# Patient Record
Sex: MALE | Race: WHITE | NOT HISPANIC OR LATINO | Employment: FULL TIME | URBAN - METROPOLITAN AREA
[De-identification: names, ages, dates, MRNs, and addresses within clinical notes are randomized per-mention and may not be internally consistent; named-entity substitution may affect disease eponyms.]

---

## 2019-11-21 ENCOUNTER — APPOINTMENT (OUTPATIENT)
Dept: RADIOLOGY | Facility: MEDICAL CENTER | Age: 44
End: 2019-11-21
Payer: COMMERCIAL

## 2019-11-21 VITALS
DIASTOLIC BLOOD PRESSURE: 81 MMHG | HEART RATE: 80 BPM | HEIGHT: 72 IN | SYSTOLIC BLOOD PRESSURE: 147 MMHG | WEIGHT: 268 LBS | BODY MASS INDEX: 36.3 KG/M2

## 2019-11-21 DIAGNOSIS — M25.521 PAIN IN RIGHT ELBOW: Primary | ICD-10-CM

## 2019-11-21 DIAGNOSIS — M25.521 PAIN IN RIGHT ELBOW: ICD-10-CM

## 2019-11-21 PROCEDURE — 99204 OFFICE O/P NEW MOD 45 MIN: CPT | Performed by: ORTHOPAEDIC SURGERY

## 2019-11-21 PROCEDURE — 73080 X-RAY EXAM OF ELBOW: CPT

## 2019-11-21 NOTE — PROGRESS NOTES
Chief Complaint     Right elbow pain secondary to injury      History of the Present Illness     Felipe Solis is a 40 y o  RHD male who presents today with chief complaint of right posterior elbow pain times 1 week  He reports that he was playing basketball when he suffered a downward blow to his right arm while going up for a rebound resulting in a jarring sensation of the elbow and immediate onset pain  He reports having swelling in the back shortly following the injury  He expresses having and intense, achy pain 6-7/10 with activities as light and attempting to throw a small piece of paper into a waist basket  He denies any previous history of injury though he has had some swelling and pain there occasionally over the last few years  He is not currently taking any medications for pain  He denies any associated bruising, numbness, or tingling  History reviewed  No pertinent past medical history  History reviewed  No pertinent surgical history  No Known Allergies    No current outpatient medications on file prior to visit  No current facility-administered medications on file prior to visit  Social History     Tobacco Use    Smoking status: Former Smoker     Last attempt to quit:      Years since quittin 9    Smokeless tobacco: Former User   Substance Use Topics    Alcohol use: Yes    Drug use: Not Currently       Family History   Problem Relation Age of Onset    Cancer Mother     Cancer Father     Hypertension Father        Review of Systems     As stated in the HPI  All other systems were reviewed and are negative  Physical Exam     /81   Pulse 80   Ht 5' 11 75" (1 822 m)   Wt 122 kg (268 lb)   BMI 36 60 kg/m²     GENERAL: This is a well-developed, well-nourished, age-appropriate patient in no acute distress  The patient is alert and oriented x3  Pleasant and cooperative  Eyes: Anicteric sclerae  Extraocular movements appear intact    HENT: Nares are patent with no drainage  Lungs: There is equal chest rise on inspection  Breathing is non-labored with no audible wheezing  Cardiovascular: No cyanosis  No upper extremity lymphadema  Skin: Skin is warm to touch  No obvious skin lesions or rashes other than described below  Neurologic: No ataxia  Psychiatric: Mood and affect are appropriate  Evaluation of the right elbow shows mild swelling about the posterior elbow at the olecranon process  Skin is warm and dry to touch with no signs of erythema, ecchymosis, or infection  He is moderately tender to palpation over the distal triceps tendon at insertion on the olecranon process  He is also tender to palpation over the medial epicondyle  He is able demonstrate active elbow extension to 20° with pain, as compared to only being able to reach 10° on the left upper extremity  He has significant symptom exacerbation with resisted elbow extension but has 5/5 strength  He is able demonstrate elbow flexion of 125°, which is equal bilaterally  Elbow is stable to varus and valgus stress  He demonstrates normal shoulder, wrist, and finger ROM  Radial, median, and ulnar motor and sensory distributions intact  2+ distal radial pulse with brisk capillary refill of the fingers  Sensation light touch in intact distally  Data Review     Results Reviewed     None             Imaging: Three-view imaging of the right elbow taken in the office and personally reviewed by me today shows evidence of an enthesophyte fracture on dorsum of the olecranon along with ulnohumeral and radiocapitellar arthrosis that is mild    Assessment and Plan      Diagnoses and all orders for this visit:    Pain in right elbow  -     XR elbow 3+ vw right; Future         49-year-old male with a presumed partial triceps rupture  Given the radiographic and clinical findings, I do believe that he likely has a partial triceps rupture    To ascertain the degree to which she has ruptured, an MRI will be obtained  We discussed that partial ruptures less than 50% can be treated either conservatively with nonsurgical means and therapy or stabilization in surgery  Typically, triceps tendon repairs are indicated for greater than 50% tendon tearing with significant weakness and or pain  Patient like to obtain the MRI and discuss this further  We will see him back in about a week after the MRI  For now, his limitations simply include no isolated triceps extensions        Follow Up: 1 week    To Do Next Visit: MRI follow up and management discussion    PROCEDURES PERFORMED:  Procedures  No Procedures performed today     Scribe Attestation    I,:   Masood Cunningham am acting as a scribe while in the presence of the attending physician :        I,:   Alexi Astudillo MD personally performed the services described in this documentation    as scribed in my presence :

## 2019-11-23 ENCOUNTER — HOSPITAL ENCOUNTER (OUTPATIENT)
Dept: MRI IMAGING | Facility: HOSPITAL | Age: 44
Discharge: HOME | End: 2019-11-23
Attending: ORTHOPAEDIC SURGERY
Payer: COMMERCIAL

## 2019-11-23 DIAGNOSIS — M25.521 PAIN IN RIGHT ELBOW: ICD-10-CM

## 2019-11-23 PROCEDURE — 73221 MRI JOINT UPR EXTREM W/O DYE: CPT

## 2019-11-26 ENCOUNTER — OFFICE VISIT (OUTPATIENT)
Dept: OBGYN CLINIC | Facility: MEDICAL CENTER | Age: 44
End: 2019-11-26
Payer: COMMERCIAL

## 2019-11-26 VITALS
HEIGHT: 72 IN | WEIGHT: 266 LBS | DIASTOLIC BLOOD PRESSURE: 84 MMHG | HEART RATE: 74 BPM | SYSTOLIC BLOOD PRESSURE: 146 MMHG | BODY MASS INDEX: 36.03 KG/M2

## 2019-11-26 DIAGNOSIS — M77.8 TENDINITIS OF RIGHT TRICEPS: Primary | ICD-10-CM

## 2019-11-26 PROCEDURE — 99214 OFFICE O/P EST MOD 30 MIN: CPT | Performed by: ORTHOPAEDIC SURGERY

## 2019-11-26 NOTE — PROGRESS NOTES
Chief Complaint     Right elbow pain      History of the Present Illness     Zehra Meek is a 40 y o  male who returns with right elbow pain  He notes that he got his MRI  He notes that his elbow is not feeling a tremendous amount different  He is still having pain with exercises where he extends his elbow  Denies any catching clicking popping or locking  No numbness or tingling  Does note a restriction to his range of motion  Has not been using a sling or splint          History reviewed  No pertinent past medical history  History reviewed  No pertinent surgical history  No Known Allergies    No current outpatient medications on file prior to visit  No current facility-administered medications on file prior to visit  Social History     Tobacco Use    Smoking status: Former Smoker     Last attempt to quit:      Years since quittin 9    Smokeless tobacco: Former User   Substance Use Topics    Alcohol use: Yes    Drug use: Not Currently       Family History   Problem Relation Age of Onset    Cancer Mother     Cancer Father     Hypertension Father        Review of Systems     As stated in the HPI  All other systems were reviewed and are negative  Physical Exam     /84   Pulse 74   Ht 5' 11 75" (1 822 m)   Wt 121 kg (266 lb)   BMI 36 33 kg/m²     GENERAL: This is a well-developed, well-nourished, age-appropriate patient in no acute distress  The patient is alert and oriented x3  Pleasant and cooperative  Eyes: Anicteric sclerae  Extraocular movements appear intact  HENT: Nares are patent with no drainage  Lungs: There is equal chest rise on inspection  Breathing is non-labored with no audible wheezing  Cardiovascular: No cyanosis  No upper extremity lymphadema  Skin: Skin is warm to touch  No obvious skin lesions or rashes other than described below  Neurologic: No ataxia  Psychiatric: Mood and affect are appropriate      Examination the right upper extremity reveals limited range of motion from about 22° to 120° in flexion extension on the right elbow  Full pro supination  Full digital motion  Brisk capillary refill  5/5 Motor to the APB, FDI, FDP2, FDP5, EDC  Sensation intact to light touch in the median, radial, and ulnar nerve distribution  Tenderness over the distal insertion of the triceps with palpable increased fight  Pain with resisted elbow extension      Data Review     Results Reviewed     None             Imaging:  MRI noncontrast of the right elbow was taken an outside facility personally reviewed by me today shows evidence of diffuse tendinopathy to the triceps insertion with an enthesophyte fracture    Assessment and Plan      Diagnoses and all orders for this visit:    Tendinitis of right triceps  -     Ambulatory referral to Physical Therapy; Future             42-year-old male with severe right triceps tendinopathy  I discussed surgical nonsurgical management with him  Surgical management would involve cutting out the chronic triceps tendon and reattaching it with either bone tunnels are suture anchors  This would require a lot of upfront cost in terms of time and physical therapy  We also discussed that there are surgical risks associated with this  Furthermore, we discussed that nonsurgical management would also be a good option  He would be working on range of motion and stretching of the right elbow for about a 6 week period and working on gentle strengthening over the following 6 weeks  Given that there is no acute rupture, we discussed that we could perform the chronic tendinopathy surgery at any point in the future  I also discussed with him that further resisted the centric loading exercises would potentially cause full rupture of the tendon, but there is no way of knowing if this would occur and with what force  He would like to pursue nonoperative management  I am in agreement that this is a reasonable plan    We will send to therapist see him back in 6 weeks        Follow Up:  6 weeks    To Do Next Visit:     PROCEDURES PERFORMED:  Procedures  No Procedures performed today

## 2019-11-29 ENCOUNTER — EVALUATION (OUTPATIENT)
Dept: PHYSICAL THERAPY | Facility: CLINIC | Age: 44
End: 2019-11-29
Payer: COMMERCIAL

## 2019-11-29 DIAGNOSIS — M77.8 TENDINITIS OF RIGHT TRICEPS: Primary | ICD-10-CM

## 2019-11-29 PROCEDURE — 97162 PT EVAL MOD COMPLEX 30 MIN: CPT | Performed by: PHYSICAL THERAPIST

## 2019-11-29 NOTE — PROGRESS NOTES
PT Evaluation     Today's date: 2019  Patient name: Tad Dowling  : 1975  MRN: [id number]  Referring provider: Ciara Skaggs MD  Dx:   Encounter Diagnosis     ICD-10-CM    1  Tendinitis of right triceps M77 9 Ambulatory referral to Physical Therapy       Start Time: 0800  Stop Time: 0900  Total time in clinic (min): 60 minutes    Assessment  Assessment details: Tad Dowling is a 40 y o  male who presents with signs and symptoms consistent of triceps tendonopathy  Patient states that he was playing basketball on  and went up for layup when another player pulled his arm back and placed it in a hyperextended position  Patient noted hear a "pop" and having immediate and swelling  Patient received MRI and x-ray which showed an incomplete partial-thickness tear of the R triceps tendon as well as a fracture to the enthesophyte  Patient presents with R elbow pain with extension and pushing up off surfaces, decreased strength in the R elbow secondary to pain, increased swelling at the R olecranon, and decreased ROM throughout R elbow  Due to these impairments, patient has difficulty performing a/iadls and recreational activities  Plan to begin strengthening after 6 weeks as per orders from doctor  Patient would benefit from skilled physical therapy to address the impairments, improve their level of function, and to improve their overall quality of life  Impairments: abnormal or restricted ROM, impaired physical strength and pain with function    Symptom irritability: moderateUnderstanding of Dx/Px/POC: good   Prognosis: good    Goals  Short Term Goals: to be achieved by 4 weeks  1) Patient to be independent with basic HEP  2) Decrease pain to 2/10 at its worst   3) Increase cervical spine ROM by 5-10 degrees in all deficient planes  4) Increase UE strength by 1/2 MMT grade in all deficient planes      Long Term Goals: to be achieved by discharge  1) FOTO equal to or greater than 81 indicating improvements with overall function  2) Patient to be independent with comprehensive HEP  3) Patient will have full ROM throughout the elbow to improve a/iadls  4) Patient will be able to tolerate resisted movements at the elbow with little to no discomfort to improve a/iadls  5) Patient will decrease pain 0/10 in order to participate in recreational activities  5) Lifting is improved to maximal level of function  Plan  Plan details: 1x per week for 4-6 weeks   Patient would benefit from: PT eval and skilled physical therapy  Planned therapy interventions: home exercise program, manual therapy, patient education, therapeutic activities, therapeutic exercise and neuromuscular re-education  Treatment plan discussed with: patient        Subjective Evaluation    History of Present Illness  Mechanism of injury: History of Current Injury: Patient states that he was playing basketball on November 7th and went up for layup when another player pulled his arm and placed it in a hyperextended position  Patient noted that he felt and heard a popping sensation and had swelling immediately  Patient states that he took off for a little and wasn't getting better so he went to the doctor  Patient received a x-ray and MRI which showed a incomplete partial-thickness tear of the R triceps tendon as well as a fracture to the enthesophyte   Patient notes that he has had tendonitis in both elbows (10+ years) for participating in sports and weight lifting most of his life  Pain location/Descriptors: R Triceps; sharp pain at the olecranon process and triceps insertion  No radiating pain or numbness and tingling  Aggravating factors: fully extending his arm; pushing off of something; repetitive movements  Easing factors: rest; occasionally iced and it seemed to get better  24 HR pattern: Patient states that the pain has gotten better     Imaging: x-ray and MRI; incomplete partial-thickness tear of the R triceps tendon as well as a fracture to the enthesophyte   Special Questions: patient states that pain used to wake him up at night but no longer does  Patient goals: Patient stays that he goals would be minimize the pain ad improve flexibility  Hobbies/Interest: Basketball, weight train, and overall exercise  Occupation: Works in office     Pain  Current pain ratin  At best pain ratin  At worst pain ratin  Location: R triceps   Quality: sharp  Relieving factors: ice and relaxation      Diagnostic Tests  X-ray: abnormal (Fracture to the enthesophyte )  MRI studies: abnormal (incomplete partial-thickness tear of the R triceps tendon as well as a fracture to the enthesophyte )  Patient Goals  Patient goals for therapy: increased motion and decreased pain  Patient goal: Patient stays that he goals would be minimize the pain ad improve flexibility           Objective     Neurological Testing     Sensation     Shoulder   Left Shoulder   Intact: light touch    Right Shoulder   Intact: light touch    Active Range of Motion   Left Shoulder   Flexion: WFL  Extension: WFL  Abduction: WFL  Internal rotation 90°: WFL    Right Shoulder   Flexion: WFL  Extension: WFL  Abduction: WFL  Internal rotation 90°: WFL    Left Elbow   Flexion: 150 degrees WFL  Extension: 0 degrees WFL    Right Elbow   Flexion: 130 degrees with pain  Extension: -9 degrees with pain    Additional Active Range of Motion Details  Functional ER and IR:   ER=L: T3 (Scapular spine); R: T3 (scapular spine)    IR=L: T7; R: T10 (pain in the right elbow)           Strength/Myotome Testing     Left Shoulder     Planes of Motion   Flexion: 5   Extension: 5   Abduction: 5     Isolated Muscles   Biceps: 5   Lower trapezius: 4+   Middle trapezius: 4   Triceps: 4 (pain  with resistance )     Right Shoulder     Planes of Motion   Flexion: 5   Extension: 5   Abduction: 5     Isolated Muscles   Biceps: 5   Lower trapezius: 4+   Middle deltoid: 5   Middle trapezius: 4   Triceps: 5     Left Elbow   Flexion: 5  Extension: 4+    Right Elbow   Flexion: 5  Extension: 4    Additional Strength Details          Tests     Additional Tests Details   Testing:   R elbow flexed: L: 41lbs; R: 41 lbs    R elbow extend: R= 36 lbs; L= 40 lbs     Edema observation: Increased edema at the olecranon process of the R elbow       Edema Measurement:  R elbow=32 5 cm; L elbow=31 5 cm               Precautions: asthma, NO STRENGTHENING for 6x weeks/Re-eval with physician       Manual              STM or IASTM to R tricep tendon              PROM elbow              Elbow Mobs                                            Exercise Diary              PROM elbow extension  HEP            PROM elbow flexion HEP            Elbow extension with gripping HEP             Shoulder retractions  HEP             AROM supination/pronation with flexbar             Flexbar wrist eccentrics              Gripping              Weighted wrist flex/ext              Shoulder extensions             Wall slides                                                                                                         Arm bike                                            Modalities

## 2019-12-05 ENCOUNTER — OFFICE VISIT (OUTPATIENT)
Dept: PHYSICAL THERAPY | Facility: CLINIC | Age: 44
End: 2019-12-05
Payer: COMMERCIAL

## 2019-12-05 DIAGNOSIS — M77.8 TENDINITIS OF RIGHT TRICEPS: Primary | ICD-10-CM

## 2019-12-05 PROCEDURE — 97110 THERAPEUTIC EXERCISES: CPT | Performed by: PHYSICAL THERAPIST

## 2019-12-05 PROCEDURE — 97140 MANUAL THERAPY 1/> REGIONS: CPT | Performed by: PHYSICAL THERAPIST

## 2019-12-05 NOTE — PROGRESS NOTES
Daily Note     Today's date: 2019  Patient name: Boubacar Jackson  : 1975  MRN: [id number]  Referring provider: Thomas Lerma MD  Dx:   Encounter Diagnosis     ICD-10-CM    1  Tendinitis of right triceps M77 9        Start Time:   Stop Time:   Total time in clinic (min): 45 minutes    Subjective: Patient states that he elbow is doing a little bit better  Patient states that the R elbow pain at the triceps tendon is 4/10  Patient notes that he has been completing his exercises 1x a time and 2x a day if he can remember  Patient has also been icing  Objective: See treatment diary below      Assessment: Patient tolerated treatment well this date  Patient had minimal amounts of pain throughout exercises  Focused on elbow AROM and wrist and forearm secondary to strengthening restrictions at the elbow  Post manual treatment slight increase in elbow flexion and more mobility throughout the entire joint  Patient would benefit from continued PT      Plan: Continue per plan of care        Precautions: asthma, NO STRENGTHENING for 6x weeks/Re-eval with physician       Manual              STM or IASTM to R tricep tendon  5'            PROM elbow (PA-AP radial head; distraction              Elbow Mobs                                            Exercise Diary              PROM elbow extension  HEP            PROM elbow flexion HEP            Elbow extension with gripping HEP             Shoulder retractions  HEP  2x10 reps            AROM supination/pronation with flexbar  2x10 reps RFB           Flexbar wrist eccentrics   3x10 reps           Gripping   Green            Weighted wrist flex/ext   3x10 reps           Shoulder extensions             Wall slides              Elbow flexion/extension AROM   3x10 reps                                                   Game ready                                        Arm bike                                            Modalities              Game -ready cold  5'  Post Tx

## 2019-12-12 ENCOUNTER — OFFICE VISIT (OUTPATIENT)
Dept: PHYSICAL THERAPY | Facility: CLINIC | Age: 44
End: 2019-12-12
Payer: COMMERCIAL

## 2019-12-12 DIAGNOSIS — M77.8 TENDINITIS OF RIGHT TRICEPS: Primary | ICD-10-CM

## 2019-12-12 PROCEDURE — 97140 MANUAL THERAPY 1/> REGIONS: CPT | Performed by: PHYSICAL THERAPIST

## 2019-12-12 PROCEDURE — 97110 THERAPEUTIC EXERCISES: CPT | Performed by: PHYSICAL THERAPIST

## 2019-12-12 NOTE — PROGRESS NOTES
Daily Note     Today's date: 2019  Patient name: Susana Yanes  : 1975  MRN: [id number]  Referring provider: Dejah Brown MD  Dx:   Encounter Diagnosis     ICD-10-CM    1  Tendinitis of right triceps M77 9        Start Time: 0800  Stop Time: 0830  Total time in clinic (min): 30 minutes    Subjective: Patient notes that the R elbow pain is improving  Patient notes that his motion is improving and that he is having less pain with movement  No other changes this date  Patient reports he has to leave 15 minutes early this session secondary to bringing his daughter to school  Objective: See treatment diary below      Assessment: Patient tolerated treatment well  Patient had minimal complaints throughout exercises  Patient noted UE fatigued during and post session  Patient would benefit from continued PT  Plan: Continue per plan of care        Precautions: asthma, NO STRENGTHENING for 6x weeks/Re-eval with physician       Manual             STM or IASTM to R tricep tendon  5' 5'           PROM elbow              Elbow Mobs (PA-AP radial head; distraction   5'                                         Exercise Diary             PROM elbow extension  HEP            PROM elbow flexion HEP            Elbow extension with gripping HEP             Shoulder retractions  HEP  2x10 reps  3x10 YTB          AROM supination/pronation with flexbar  3x10 reps 2# 3x10 reps 2#          Flexbar wrist eccentrics   3x10 reps 3x10 reps          Gripping   Green   3x10 Green   3x10          Weighted wrist flex/ext   3x10 reps  3# 3x10 reps  3#          Shoulder extensions    3x10 reps          Wall slides with lift off   3x10 reps           Elbow flexion/extension AROM   3x10 reps  3x10 reps                                                                                         Arm bike                                            Modalities              Game -ready cold  5'  Post Tx 30 min treatment secondary to patient leaving early

## 2019-12-17 NOTE — PROGRESS NOTES
Daily Note     Today's date: 2019  Patient name: Darci Cockayne  : 1975  MRN: [id number]  Referring provider: Ana Prather MD  Dx: No diagnosis found  Subjective: ***      Objective: See treatment diary below      Assessment: Tolerated treatment well  Patient would benefit from continued PT      Plan: Continue per plan of care        Precautions: asthma, NO STRENGTHENING for 6x weeks/Re-eval with physician       Manual             STM or IASTM to R tricep tendon  5' 5'           PROM elbow              Elbow Mobs (PA-AP radial head; distraction   5'                                         Exercise Diary             PROM elbow extension  HEP            PROM elbow flexion HEP            Elbow extension with gripping HEP             Shoulder retractions  HEP  2x10 reps  3x10 YTB          AROM supination/pronation with flexbar  3x10 reps 2# 3x10 reps 2#          Flexbar wrist eccentrics   3x10 reps 3x10 reps          Gripping   Green   3x10 Green   3x10          Weighted wrist flex/ext   3x10 reps  3# 3x10 reps  3#          Shoulder extensions    3x10 reps          Wall slides with lift off   3x10 reps           Elbow flexion/extension AROM   3x10 reps  3x10 reps                                                                                         Arm bike                                            Modalities              Game -ready cold  5'  Post Tx                                      30 min treatment secondary to patient leaving early

## 2019-12-18 ENCOUNTER — APPOINTMENT (OUTPATIENT)
Dept: PHYSICAL THERAPY | Facility: CLINIC | Age: 44
End: 2019-12-18
Payer: COMMERCIAL

## 2019-12-23 NOTE — PROGRESS NOTES
Daily Note     Today's date: 2019  Patient name: Bhumika Davila  : 1975  MRN: [id number]  Referring provider: Demi Stuart MD  Dx: No diagnosis found  Subjective: ***      Objective: See treatment diary below      Assessment: Patient tolerated treatment well  Patient would benefit from continued PT      Plan: Continue per plan of care        Precautions: asthma, NO STRENGTHENING for 6x weeks/Re-eval with physician       Manual            STM or IASTM to R tricep tendon  5' 5'           PROM elbow              Elbow Mobs (PA-AP radial head; distraction   5'                                         Exercise Diary            PROM elbow extension  HEP            PROM elbow flexion HEP            Elbow extension with gripping HEP             Shoulder retractions  HEP  2x10 reps  3x10 YTB          AROM supination/pronation with flexbar  3x10 reps 2# 3x10 reps 2#          Flexbar wrist eccentrics   3x10 reps 3x10 reps          Gripping   Green   3x10 Green   3x10          Weighted wrist flex/ext   3x10 reps  3# 3x10 reps  3#          Shoulder extensions    3x10 reps          Wall slides with lift off   3x10 reps           Elbow flexion/extension AROM   3x10 reps  3x10 reps                                                                                         Arm bike                                            Modalities              Game -ready cold  5'  Post Tx                                      30 min treatment secondary to patient leaving early

## 2019-12-26 ENCOUNTER — APPOINTMENT (OUTPATIENT)
Dept: PHYSICAL THERAPY | Facility: CLINIC | Age: 44
End: 2019-12-26
Payer: COMMERCIAL

## 2019-12-27 ENCOUNTER — OFFICE VISIT (OUTPATIENT)
Dept: PHYSICAL THERAPY | Facility: CLINIC | Age: 44
End: 2019-12-27
Payer: COMMERCIAL

## 2019-12-27 DIAGNOSIS — M77.8 TENDINITIS OF RIGHT TRICEPS: Primary | ICD-10-CM

## 2019-12-27 PROCEDURE — 97140 MANUAL THERAPY 1/> REGIONS: CPT | Performed by: PHYSICAL THERAPIST

## 2019-12-27 PROCEDURE — 97110 THERAPEUTIC EXERCISES: CPT | Performed by: PHYSICAL THERAPIST

## 2019-12-27 PROCEDURE — 97112 NEUROMUSCULAR REEDUCATION: CPT | Performed by: PHYSICAL THERAPIST

## 2019-12-27 NOTE — PROGRESS NOTES
Daily Note     Today's date: 2019  Patient name: Felipe Solis  : 1975  MRN: [id number]  Referring provider: Aquilino Flaherty MD  Dx:   Encounter Diagnosis     ICD-10-CM    1  Tendinitis of right triceps M77 9        Start Time: 830  Stop Time: 915  Total time in clinic (min): 45 minutes    Subjective: Patient notes decreased pain from last session  Continues to have pain when end range extension as well as palpation  Objective: See treatment diary below      Assessment: Patient tolerated treatment well  Patient responded well to new exercises and progressions with minimal complaints of pain  Patient would benefit from continued PT  Plan: Continue per plan of care        Precautions: asthma, NO STRENGTHENING for 6x weeks/Re-eval with physician       Manual            STM or IASTM to R tricep tendon  5' 5' 5'          PROM elbow              Elbow Mobs (PA-AP radial head; distraction   5' 5'                                        Exercise Diary            PROM elbow extension  HEP            PROM elbow flexion HEP            Elbow extension with gripping HEP             Resisted Rows HEP  2x10 reps  3x10 YTB 3x10 RTB         AROM supination/pronation with flexbar  3x10 reps 2# 3x10 reps 2# 3x10 reps 5#         Flexbar wrist eccentrics   3x10 reps 3x10 reps 3x10 reps         Gripping   Green   3x10 Green   3x10 Blue   3x10         Weighted wrist flex/ext   3x10 reps  3# 3x10 reps  3# 3x10 reps  4#         Shoulder extensions    3x10 reps 3x10 reps         Wall slides with lift off   3x10 reps  3x10 reps          Elbow flexion/extension AROM   3x10 reps  3x10 reps  3x10 reps          Wall slides with lift     3x10 YTB          Wall circles     Red ball 2x30" CW and CCW         Shoulder ABD and flexion    3x10   4#                                                Arm bike                                            Modalities              Game -ready cold  5'  Post Tx                                      1:1 with PT from 8:30-9:01

## 2020-01-30 NOTE — PROGRESS NOTES
PT DISCHARGE    Patient has not returned since last session on 12/27/2020  Patient called by therapist to call however with busy schedule patient noted he would call back after scheduled check  Patient never called back for appointment  Progress unable to be formally assessed  Patient DC this date

## 2020-06-24 RX ORDER — MELOXICAM 7.5 MG/1
1 TABLET ORAL 2 TIMES DAILY
COMMUNITY
Start: 2016-07-07 | End: 2020-06-25

## 2020-06-24 RX ORDER — TRAMADOL HYDROCHLORIDE 50 MG/1
1-2 TABLET ORAL EVERY 6 HOURS PRN
COMMUNITY
Start: 2016-07-07 | End: 2020-06-25

## 2020-06-25 ENCOUNTER — OFFICE VISIT (OUTPATIENT)
Dept: FAMILY MEDICINE CLINIC | Facility: CLINIC | Age: 45
End: 2020-06-25
Payer: COMMERCIAL

## 2020-06-25 VITALS
DIASTOLIC BLOOD PRESSURE: 92 MMHG | HEART RATE: 76 BPM | OXYGEN SATURATION: 98 % | SYSTOLIC BLOOD PRESSURE: 130 MMHG | TEMPERATURE: 97.8 F | HEIGHT: 72 IN | BODY MASS INDEX: 36.57 KG/M2 | WEIGHT: 270 LBS | RESPIRATION RATE: 16 BRPM

## 2020-06-25 DIAGNOSIS — E55.9 VITAMIN D INSUFFICIENCY: ICD-10-CM

## 2020-06-25 DIAGNOSIS — R31.29 MICROSCOPIC HEMATURIA: Primary | ICD-10-CM

## 2020-06-25 DIAGNOSIS — R10.31 RIGHT LOWER QUADRANT ABDOMINAL PAIN: ICD-10-CM

## 2020-06-25 DIAGNOSIS — E78.2 MIXED HYPERLIPIDEMIA: ICD-10-CM

## 2020-06-25 DIAGNOSIS — R53.83 FATIGUE, UNSPECIFIED TYPE: ICD-10-CM

## 2020-06-25 PROCEDURE — 1036F TOBACCO NON-USER: CPT | Performed by: FAMILY MEDICINE

## 2020-06-25 PROCEDURE — 99214 OFFICE O/P EST MOD 30 MIN: CPT | Performed by: FAMILY MEDICINE

## 2020-06-25 PROCEDURE — 3008F BODY MASS INDEX DOCD: CPT | Performed by: FAMILY MEDICINE

## 2021-02-23 LAB
25(OH)D3 SERPL-MCNC: 32 NG/ML (ref 30–100)
ALBUMIN SERPL-MCNC: 4.3 G/DL (ref 3.6–5.1)
ALBUMIN/GLOB SERPL: 1.6 (CALC) (ref 1–2.5)
ALP SERPL-CCNC: 52 U/L (ref 36–130)
ALT SERPL-CCNC: 15 U/L (ref 9–46)
APPEARANCE UR: CLEAR
AST SERPL-CCNC: 12 U/L (ref 10–40)
BACTERIA UR QL AUTO: NORMAL /HPF
BASOPHILS # BLD AUTO: 29 CELLS/UL (ref 0–200)
BASOPHILS NFR BLD AUTO: 0.7 %
BILIRUB SERPL-MCNC: 0.7 MG/DL (ref 0.2–1.2)
BILIRUB UR QL STRIP: NEGATIVE
BUN SERPL-MCNC: 15 MG/DL (ref 7–25)
BUN/CREAT SERPL: NORMAL (CALC) (ref 6–22)
CALCIUM SERPL-MCNC: 9.4 MG/DL (ref 8.6–10.3)
CHLORIDE SERPL-SCNC: 105 MMOL/L (ref 98–110)
CHOLEST SERPL-MCNC: 237 MG/DL
CHOLEST/HDLC SERPL: 5 (CALC)
CO2 SERPL-SCNC: 27 MMOL/L (ref 20–32)
COLOR UR: YELLOW
CREAT SERPL-MCNC: 1.1 MG/DL (ref 0.6–1.35)
EOSINOPHIL # BLD AUTO: 80 CELLS/UL (ref 15–500)
EOSINOPHIL NFR BLD AUTO: 1.9 %
ERYTHROCYTE [DISTWIDTH] IN BLOOD BY AUTOMATED COUNT: 15.4 % (ref 11–15)
GLOBULIN SER CALC-MCNC: 2.7 G/DL (CALC) (ref 1.9–3.7)
GLUCOSE SERPL-MCNC: 91 MG/DL (ref 65–99)
GLUCOSE UR QL STRIP: NEGATIVE
HCT VFR BLD AUTO: 46.7 % (ref 38.5–50)
HDLC SERPL-MCNC: 47 MG/DL
HGB BLD-MCNC: 14.6 G/DL (ref 13.2–17.1)
HGB UR QL STRIP: NEGATIVE
HYALINE CASTS #/AREA URNS LPF: NORMAL /LPF
KETONES UR QL STRIP: NEGATIVE
LDLC SERPL CALC-MCNC: 161 MG/DL (CALC)
LEUKOCYTE ESTERASE UR QL STRIP: NEGATIVE
LYMPHOCYTES # BLD AUTO: 1445 CELLS/UL (ref 850–3900)
LYMPHOCYTES NFR BLD AUTO: 34.4 %
MCH RBC QN AUTO: 25.2 PG (ref 27–33)
MCHC RBC AUTO-ENTMCNC: 31.3 G/DL (ref 32–36)
MCV RBC AUTO: 80.5 FL (ref 80–100)
MONOCYTES # BLD AUTO: 416 CELLS/UL (ref 200–950)
MONOCYTES NFR BLD AUTO: 9.9 %
NEUTROPHILS # BLD AUTO: 2230 CELLS/UL (ref 1500–7800)
NEUTROPHILS NFR BLD AUTO: 53.1 %
NITRITE UR QL STRIP: NEGATIVE
NONHDLC SERPL-MCNC: 190 MG/DL (CALC)
PH UR STRIP: 5.5 [PH] (ref 5–8)
PLATELET # BLD AUTO: 204 THOUSAND/UL (ref 140–400)
PMV BLD REES-ECKER: 11.1 FL (ref 7.5–12.5)
POTASSIUM SERPL-SCNC: 4.3 MMOL/L (ref 3.5–5.3)
PROT SERPL-MCNC: 7 G/DL (ref 6.1–8.1)
PROT UR QL STRIP: NEGATIVE
RBC # BLD AUTO: 5.8 MILLION/UL (ref 4.2–5.8)
RBC #/AREA URNS HPF: NORMAL /HPF
SL AMB EGFR AFRICAN AMERICAN: 93 ML/MIN/1.73M2
SL AMB EGFR NON AFRICAN AMERICAN: 81 ML/MIN/1.73M2
SODIUM SERPL-SCNC: 139 MMOL/L (ref 135–146)
SP GR UR STRIP: 1.02 (ref 1–1.03)
SQUAMOUS #/AREA URNS HPF: NORMAL /HPF
TRIGL SERPL-MCNC: 146 MG/DL
TSH SERPL-ACNC: 1.98 MIU/L (ref 0.4–4.5)
WBC # BLD AUTO: 4.2 THOUSAND/UL (ref 3.8–10.8)
WBC #/AREA URNS HPF: NORMAL /HPF

## 2021-02-26 ENCOUNTER — TELEMEDICINE (OUTPATIENT)
Dept: FAMILY MEDICINE CLINIC | Facility: CLINIC | Age: 46
End: 2021-02-26
Payer: COMMERCIAL

## 2021-02-26 DIAGNOSIS — E78.2 MIXED HYPERLIPIDEMIA: ICD-10-CM

## 2021-02-26 DIAGNOSIS — R63.8 INCREASED BMI: Primary | ICD-10-CM

## 2021-02-26 DIAGNOSIS — D50.8 IRON DEFICIENCY ANEMIA SECONDARY TO INADEQUATE DIETARY IRON INTAKE: ICD-10-CM

## 2021-02-26 PROCEDURE — 99214 OFFICE O/P EST MOD 30 MIN: CPT | Performed by: FAMILY MEDICINE

## 2021-02-26 NOTE — PATIENT INSTRUCTIONS
Iron Deficiency Anemia   WHAT YOU NEED TO KNOW:   Iron deficiency anemia (JOSEFINA) is low levels of red blood cells and hemoglobin caused by a lack of iron in the blood  Iron helps make hemoglobin  Hemoglobin is part of your red blood cell and helps carry oxygen to your body  The most common causes are blood loss and not enough iron in the foods you eat  DISCHARGE INSTRUCTIONS:   Call 911 for any of the following:   · You have shortness of breath, even when you rest       Return to the emergency department if:   · You have dark or bloody bowel movements  · You are too dizzy to stand up  · You have trouble swallowing because of the pain in your mouth and throat  Contact your healthcare provider if:   · You have heartburn, constipation, or diarrhea  · You have nausea or are vomiting  · You are dizzy or very tired  · You have questions or concerns about your condition or care  Medicines: You may need any of the following:  · Iron or folic acid supplements  will help increase your red blood cell and hemoglobin levels  · Bowel movement softeners  may be needed if the iron supplements cause constipation  · Take your medicine as directed  Contact your healthcare provider if you think your medicine is not helping or if you have side effects  Tell him of her if you are allergic to any medicine  Keep a list of the medicines, vitamins, and herbs you take  Include the amounts, and when and why you take them  Bring the list or the pill bottles to follow-up visits  Carry your medicine list with you in case of an emergency  Eat foods rich in iron and protein:  Nuts, meat, dark leafy green vegetables, and beans are high in iron and protein  Do not drink coffee, tea, or other liquids with caffeine  Limit milk to 2 cups a day  You may need to meet with a dietitian to create the right food plan for you  Drink liquids as directed:  Liquids help prevent constipation   Ask how much liquid to drink each day and which liquids are best for you  Follow up with your healthcare provider as directed:  Write down your questions so you remember to ask them during your visits  © Copyright [address] Information is for End User's use only and may not be sold, redistributed or otherwise used for commercial purposes  All illustrations and images included in CareNotes® are the copyrighted property of [employer]  or [address] [place], [place] [address]mónica Grace   The above information is an  only  It is not intended as medical advice for individual conditions or treatments  Talk to your doctor, nurse or pharmacist before following any medical regimen to see if it is safe and effective for you

## 2021-02-26 NOTE — PROGRESS NOTES
BMI Counseling: There is no height or weight on file to calculate BMI  The BMI is above normal  Nutrition recommendations include decreasing portion sizes, encouraging healthy choices of fruits and vegetables, decreasing fast food intake, consuming healthier snacks, limiting drinks that contain sugar, moderation in carbohydrate intake, increasing intake of lean protein and reducing intake of saturated and trans fat  Exercise recommendations include moderate physical activity 150 minutes/week and exercising 3-5 times per week  No pharmacotherapy was ordered  Just lost 60 lbs in 6 mo  On Keto diet  Avoid CHO's

## 2021-02-26 NOTE — PROGRESS NOTES
Virtual Regular Visit      Assessment/Plan: Pt seen virtually to review lab studies and diet- lost 60 lbs in 6 mo on Keto diet  That however, has left him with chol 237, , trig 146, and HDL 47  He is not wanting to take statins  Will do further diet and consult dietician     Problem List Items Addressed This Visit     None      Visit Diagnoses     Increased BMI    -  Primary    Lost 60 lbs since Aug -- on Keto diet, no CHO or sugar at all  Less then 40 cho / day  Charlott Serum ffrom 275 to 215  Feels great     Iron deficiency anemia secondary to inadequate dietary iron intake        Despite hgb of 14 +,  MCH and MCHC are low end and may be iron defic  Refer to dietician  He is screened for colon ca regularly    Relevant Orders    Iron    Ferritin    TIBC    Iron Saturation %    Retic Count with Reticulocyte HGB    Ambulatory referral to Nutrition Services    Mixed hyperlipidemia        Relevant Orders    Ambulatory referral to Nutrition Services               Reason for visit is   Chief Complaint   Patient presents with    Follow-up    Virtual Regular Visit        Encounter provider Shanna Cox DO    Provider located at [address] [place] [address] [address] [facility] [address] [phone]      Recent Visits  No visits were found meeting these conditions  Showing recent visits within past 7 days and meeting all other requirements     Today's Visits  Date Type Provider Dept   02/26/21 Telemedicine Shanna Cox DO  Primary [facility] [place]   Showing today's visits and meeting all other requirements     Future Appointments  No visits were found meeting these conditions  Showing future appointments within next 150 days and meeting all other requirements        The patient was identified by name and date of birth   Adali Ramirez was informed that this is a telemedicine visit and that the visit is being conducted through [address] [place] [address] and patient was informed that this is not a secure, HIPAA-compliant platform  He agrees to proceed     My office door was closed  No one else was in the room  He acknowledged consent and understanding of privacy and security of the video platform  The patient has agreed to participate and understands they can discontinue the visit at any time  Patient is aware this is a billable service  Subjective  Joe Vincent is a 39 y o  male in now acute distress, wt down 60 lbs in 6 mo   HPI ---Pt seen virtually to review lab studies and diet- lost 60 lbs in 6 mo on Keto diet  That however, has left him with chol 237, , trig 146, and HDL 47  He is not wanting to take statins  Will do further diet and consult dietician     Past Medical History:   Diagnosis Date    Bone pain     Colon polyps     Hypertension     Joint pain     Knee pain     from basketball    Muscle pain        Past Surgical History:   Procedure Laterality Date    COLONOSCOPY  10/27/2015    WISDOM TOOTH EXTRACTION         No current outpatient medications on file  No current facility-administered medications for this visit  No Known Allergies    Review of Systems   Constitutional: Negative for activity change, appetite change, chills, diaphoresis, fatigue, fever and unexpected weight change  HENT: Negative for congestion, dental problem, drooling, ear discharge, ear pain, facial swelling, mouth sores, nosebleeds, postnasal drip, rhinorrhea, trouble swallowing and voice change  Eyes: Negative for photophobia, pain, discharge, redness, itching and visual disturbance  Respiratory: Negative for apnea, cough, choking, chest tightness and shortness of breath  Cardiovascular: Negative for chest pain and leg swelling  Gastrointestinal: Negative for abdominal distention, abdominal pain, constipation, diarrhea and nausea  Endocrine: Negative for polydipsia, polyphagia and polyuria     Genitourinary: Negative for decreased urine volume, difficulty urinating, dysuria, enuresis and hematuria  Musculoskeletal: Negative for arthralgias, back pain, gait problem and joint swelling  Skin: Negative for color change, pallor, rash and wound  Allergic/Immunologic: Negative for immunocompromised state  Neurological: Negative for dizziness, seizures, syncope, facial asymmetry, speech difficulty, light-headedness and headaches  Hematological: Negative for adenopathy  Psychiatric/Behavioral: Negative for agitation, behavioral problems, confusion and decreased concentration  Video Exam    There were no vitals filed for this visit  Physical Exam     I spent 25 minutes directly with the patient during this visit      VIRTUAL VISIT DISCLAIMER    Felipe Solis acknowledges that he has consented to an online visit or consultation  He understands that the online visit is based solely on information provided by him, and that, in the absence of a face-to-face physical evaluation by the physician, the diagnosis he receives is both limited and provisional in terms of accuracy and completeness  This is not intended to replace a full medical face-to-face evaluation by the physician  Felipe Solis understands and accepts these terms

## 2021-03-30 DIAGNOSIS — Z23 ENCOUNTER FOR IMMUNIZATION: ICD-10-CM

## 2021-04-18 LAB
FERRITIN SERPL-MCNC: 122 NG/ML (ref 38–380)
IRON SATN MFR SERPL: 34 % (CALC) (ref 20–48)
IRON SERPL-MCNC: 110 MCG/DL (ref 50–180)
RETICS #: ABNORMAL CELLS/UL (ref 25000–90000)
RETICS/RBC NFR AUTO: 1.9 %
TIBC SERPL-MCNC: 328 MCG/DL (CALC) (ref 250–425)

## 2021-04-19 ENCOUNTER — TELEPHONE (OUTPATIENT)
Dept: FAMILY MEDICINE CLINIC | Facility: CLINIC | Age: 46
End: 2021-04-19

## 2021-04-19 DIAGNOSIS — R53.83 FATIGUE, UNSPECIFIED TYPE: ICD-10-CM

## 2021-04-19 DIAGNOSIS — D50.8 IRON DEFICIENCY ANEMIA SECONDARY TO INADEQUATE DIETARY IRON INTAKE: Primary | ICD-10-CM

## 2021-04-19 NOTE — TELEPHONE ENCOUNTER
Pt's wife called - Lab results are showing in [place] and showing abnormalities  Pt wanting to know what to do next     [phone]

## 2022-05-13 ENCOUNTER — TELEPHONE (OUTPATIENT)
Dept: OBGYN CLINIC | Facility: HOSPITAL | Age: 47
End: 2022-05-13

## 2022-05-13 NOTE — TELEPHONE ENCOUNTER
Patient called to see if Dr Regis Santos had any cancellations  Appointment scheduled for 5/25 at this time  Advised I did not have anything  Patient verbalized understanding

## 2022-05-23 ENCOUNTER — TELEMEDICINE (OUTPATIENT)
Dept: FAMILY MEDICINE CLINIC | Facility: CLINIC | Age: 47
End: 2022-05-23
Payer: COMMERCIAL

## 2022-05-23 ENCOUNTER — TELEPHONE (OUTPATIENT)
Dept: FAMILY MEDICINE CLINIC | Facility: CLINIC | Age: 47
End: 2022-05-23

## 2022-05-23 DIAGNOSIS — U07.1 COVID-19: Primary | ICD-10-CM

## 2022-05-23 DIAGNOSIS — J02.9 EXUDATIVE PHARYNGITIS: ICD-10-CM

## 2022-05-23 PROCEDURE — 99213 OFFICE O/P EST LOW 20 MIN: CPT | Performed by: NURSE PRACTITIONER

## 2022-05-23 RX ORDER — AMOXICILLIN 875 MG/1
875 TABLET, COATED ORAL 2 TIMES DAILY
Qty: 10 TABLET | Refills: 0 | Status: SHIPPED | OUTPATIENT
Start: 2022-05-23 | End: 2022-05-28

## 2022-05-23 NOTE — PROGRESS NOTES
COVID-19 Outpatient Progress Note    Assessment/Plan:    Presents in office via virtual visit   55 yr old of Dr Gold Dumas   C/o nasal congestion and sinus pressure and sore throat - nasal pressure and headaches  He has been immunized and boosted   Discussed adequate hydration   Supportive measures and reportable s/s discussed and when to seek urgent care   Follow up in 2 weeks in office     Problem List Items Addressed This Visit    None     Visit Diagnoses     COVID-19    -  Primary    Relevant Medications    amoxicillin (AMOXIL) 875 mg tablet    Exudative pharyngitis        Relevant Medications    amoxicillin (AMOXIL) 875 mg tablet         Disposition:     Patient has COVID-19 infection  Based off CDC guidelines, they were recommended to isolate for 5 days from the date of the positive test  If they remain asymptomatic, isolation may be ended followed by 5 days of wearing a mask when around othes to minimize risk of infecting others  If they have a fever, continue to stay home until fever resolves for at least 24 hours  I recommended continued isolation until at least 24 hours have passed since recovery defined as resolution of fever without the use of fever-reducing medications AND improvement in COVID symptoms AND 10 days have passed since onset of symptoms (or 10 days have passed since date of first positive viral diagnostic test for asymptomatic patients)  Discussed symptom directed medication options with patient  Discussed vitamin D, vitamin C, and/or zinc supplementation with patient  I have spent 15 minutes directly with the patient  Greater than 50% of this time was spent in counseling/coordination of care regarding: diagnostic results, prognosis, risks and benefits of treatment options, instructions for management, patient and family education, importance of treatment compliance, risk factor reductions and impressions   Does not qualify for Monoclonals antibody      Encounter provider Zamzam Charles Lori Madrigal    Provider located at [address] [place] [address]  [address] [place] [address],[address] [place] [address]    Recent Visits  No visits were found meeting these conditions  Showing recent visits within past 7 days and meeting all other requirements  Today's Visits  Date Type Provider Dept   05/23/22 Telephone [facility]   05/23/22 [address], [address] [facility] today's visits and meeting all other requirements  Future Appointments  No visits were found meeting these conditions  Showing future appointments within next 150 days and meeting all other requirements     This virtual check-in was done via [facility] and patient was informed that this is a secure, HIPAA-compliant platform  He agrees to proceed  Patient agrees to participate in a virtual check in via telephone or video visit instead of presenting to the office to address urgent/immediate medical needs  Patient is aware this is a billable service  After connecting through [facility], the patient was identified by name and date of birth  Boubacar Jackson was informed that this was a telemedicine visit and that the exam was being conducted confidentially over secure lines  My office door was closed  No one else was in the room  Boubacar Jackson acknowledged consent and understanding of privacy and security of the telemedicine visit  I informed the patient that I have reviewed his record in Epic and presented the opportunity for him to ask any questions regarding the visit today  The patient agreed to participate  Verification of patient location:  Patient is located in the following state in which I hold an active license: [place]    Subjective:   Boubacar Jackson is a 55 y o  male who has been screened for COVID-19  Symptom change since last report: worsening  Patient's symptoms include chills, fatigue, nasal congestion, sore throat, myalgias and headache   Patient denies cough, shortness of breath, abdominal pain, nausea and vomiting      - Date of symptom onset: 5/18/2022  - Date of positive COVID-19 test: 5/22/2022  COVID-19 vaccination status: Fully vaccinated with booster    Bean Jefferson has been staying home and has isolated themselves in his home  He is taking care to not share personal items and is cleaning all surfaces that are touched often, like counters, tabletops, and doorknobs using household cleaning sprays or wipes  He is wearing a mask when he leaves his room  No results found for: SARSCOV2, [facility], SARSCORONAVI, CORONAVIRUSR, SARSCOVAG, [facility]  Past Medical History:   Diagnosis Date    Bone pain     Colon polyps     Hypertension     Joint pain     Knee pain     from basketball    Muscle pain      Past Surgical History:   Procedure Laterality Date    COLONOSCOPY  10/27/2015    WISDOM TOOTH EXTRACTION       Current Outpatient Medications   Medication Sig Dispense Refill    amoxicillin (AMOXIL) 875 mg tablet Take 1 tablet (875 mg total) by mouth in the morning and 1 tablet (875 mg total) in the evening  Do all this for 5 days  10 tablet 0     No current facility-administered medications for this visit  No Known Allergies    Review of Systems   Constitutional: Positive for chills and fatigue  HENT: Positive for congestion and sore throat  Respiratory: Negative for cough and shortness of breath  Cardiovascular: Negative for chest pain and palpitations  Gastrointestinal: Negative for abdominal distention, abdominal pain, nausea and vomiting  Genitourinary: Negative for difficulty urinating and flank pain  Musculoskeletal: Positive for arthralgias and myalgias  Skin: Negative for rash  Neurological: Positive for headaches  Objective: There were no vitals filed for this visit  Physical Exam  Constitutional:       Appearance: Normal appearance  HENT:      Nose: Congestion and rhinorrhea present     Pulmonary: Effort: Pulmonary effort is normal    Neurological:      Mental Status: He is alert and oriented to person, place, and time  Psychiatric:         Mood and Affect: Mood normal          Behavior: Behavior normal          VIRTUAL VISIT DISCLAIMER    Trinidad Sai verbally agrees to participate in [employer]  Pt is aware that [employer] could be limited without vital signs or the ability to perform a full hands-on physical exam  Ranjith Lea understands he or the provider may request at any time to terminate the video visit and request the patient to seek care or treatment in person

## 2022-05-23 NOTE — TELEPHONE ENCOUNTER
Jr Beltran called, he is waiting for a virtual with you, He is just wondering what time you might call him    Thanks

## 2022-05-25 ENCOUNTER — APPOINTMENT (OUTPATIENT)
Dept: RADIOLOGY | Facility: MEDICAL CENTER | Age: 47
End: 2022-05-25
Payer: COMMERCIAL

## 2022-05-25 ENCOUNTER — OFFICE VISIT (OUTPATIENT)
Dept: OBGYN CLINIC | Facility: MEDICAL CENTER | Age: 47
End: 2022-05-25
Payer: COMMERCIAL

## 2022-05-25 VITALS
HEART RATE: 65 BPM | WEIGHT: 217 LBS | HEIGHT: 72 IN | DIASTOLIC BLOOD PRESSURE: 86 MMHG | SYSTOLIC BLOOD PRESSURE: 132 MMHG | BODY MASS INDEX: 29.39 KG/M2

## 2022-05-25 DIAGNOSIS — G89.29 CHRONIC LEFT-SIDED LOW BACK PAIN WITHOUT SCIATICA: ICD-10-CM

## 2022-05-25 DIAGNOSIS — M54.50 CHRONIC LEFT-SIDED LOW BACK PAIN WITHOUT SCIATICA: Primary | ICD-10-CM

## 2022-05-25 DIAGNOSIS — G89.29 CHRONIC LEFT-SIDED LOW BACK PAIN WITHOUT SCIATICA: Primary | ICD-10-CM

## 2022-05-25 DIAGNOSIS — M54.50 CHRONIC LEFT-SIDED LOW BACK PAIN WITHOUT SCIATICA: ICD-10-CM

## 2022-05-25 PROCEDURE — 3008F BODY MASS INDEX DOCD: CPT | Performed by: PHYSICAL MEDICINE & REHABILITATION

## 2022-05-25 PROCEDURE — 1036F TOBACCO NON-USER: CPT | Performed by: PHYSICAL MEDICINE & REHABILITATION

## 2022-05-25 PROCEDURE — 99214 OFFICE O/P EST MOD 30 MIN: CPT | Performed by: PHYSICAL MEDICINE & REHABILITATION

## 2022-05-25 PROCEDURE — 73502 X-RAY EXAM HIP UNI 2-3 VIEWS: CPT

## 2022-05-25 RX ORDER — MELOXICAM 15 MG/1
15 TABLET ORAL DAILY PRN
Qty: 90 TABLET | Refills: 0 | Status: SHIPPED | OUTPATIENT
Start: 2022-05-25

## 2022-05-25 RX ORDER — PREDNISONE 20 MG/1
40 TABLET ORAL
Qty: 10 TABLET | Refills: 0 | Status: SHIPPED | OUTPATIENT
Start: 2022-05-25 | End: 2022-05-30

## 2022-05-25 NOTE — PATIENT INSTRUCTIONS
Rules for limiting activity by pain symptoms:      -You can work through some pain, but keep it at a level from which you are distractible  Pain should not exceed 3/10 in severity    -Do not change your biomechanics / form with your activity  This can lead to further injury    -If you pay for your activity later with substantial symptom exacerbation, you are not yet ready for that level of exertion  You will be starting physical therapy  It is important to do home exercises as given by your physical therapist as you go through PT to speed up your recovery  Physical therapy addresses the problems that are causing your pain, instead of covering them up, as some other treatment options do

## 2022-05-25 NOTE — PROGRESS NOTES
1  Chronic left-sided low back pain without sciatica  Ambulatory referral to Physical Therapy    predniSONE 20 mg tablet    meloxicam (MOBIC) 15 mg tablet    CANCELED: XR spine lumbar 2 or 3 views injury     Orders Placed This Encounter   Procedures    Ambulatory referral to Physical Therapy     Impression:  Left-sided low back and groin pain that is multifactorial and predominantly due to sacroiliac joint dysfunction and pincer type femoroacetabular impingement syndrome  There are no concerning neurological deficits  We discussed different treatment options and decided to proceed with prednisone burst to be followed by meloxicam      The patient should start physical therapy as soon as possible  I will see them back after 3-4 weeks of physical therapy or earlier if symptoms worsen/change  Can consider ultrasound-guided hip injection depending on presentation  Return in about 4 weeks (around 6/22/2022)  Patient is in agreement with the above plan  Imaging Studies (I personally reviewed images in PACS and report if it was available):  Left hip x-rays that are most recent to this encounter were reviewed  These images show significant over coverage of the acetabulum consistent with pincer type femoroacetabular impingement  There is also a rounded radiolucency in the femoral head that is seen on both sides which could represent a jared pit in an atypical region  HPI:  Boubacar Jackson is a 55 y o  male  who presents for evaluation of   Chief Complaint   Patient presents with    Lower Back - Pain    Left Hip - Pain       Onset/Mechanism:  A few months ago he was playing basketball and the pain started after he came down from a rebound  Location:  Left groin and low back area  Radiation:  Denies  Quality:  Painful  Provocative: Inactivity  Severity:  5/10  Associated Symptoms:  Denies  Treatment so far: Activity modification      No red flag symptoms including fever/chills, unintentional weight change, bowel/bladder incontinence, scissoring gait, personal/family history of cancer, pain worse at night, intravenous drug abuse or trauma  Following history reviewed and updated:  Past Medical History:   Diagnosis Date    Bone pain     Colon polyps     COVID 2022    Hypertension     Joint pain     Knee pain     from basketball    Muscle pain      Past Surgical History:   Procedure Laterality Date    COLONOSCOPY  10/27/2015    WISDOM TOOTH EXTRACTION       Social History   Social History     Substance and Sexual Activity   Alcohol Use Yes     Social History     Substance and Sexual Activity   Drug Use Not Currently     Social History     Tobacco Use   Smoking Status Former Smoker    Quit date:     Years since quittin 4   Smokeless Tobacco Former User     Family History   Problem Relation Age of Onset    Colon cancer Mother     Hypertension Father     Colon cancer Father 48    Testicular cancer Father 39    Prostate cancer Father 72    Kidney cancer Father 72    Cancer Other      No Known Allergies     Constitutional:  /86   Pulse 65   Ht 6' (1 829 m)   Wt 98 4 kg (217 lb)   BMI 29 43 kg/m²    General: NAD  Eyes: Anicteric sclerae  Neck: Supple  Lungs: Unlabored breathing  Cardiovascular: No lower extremity edema  Skin: Intact without erythema  Neurologic: Sensation intact to light touch  Psychiatric: Mood and affect are appropriate  Orthopedic Examination   Inspection: No obvious deformities, lesions or rashes   ROM: Within normal limits   Palpation:  Tender to palpation along the left sacroiliac joint  There are no step offs   Neuro: Bilateral extremity strength is normal and symmetric  No muscle atrophy or abnormal tone  Bilateral extremity muscle stretch reflexes are physiologic and symmetric   No myelopathic signs  Sensation to light touch is intact throughout    Neural compression testing: Normal bilateral SLR/dural stretch  Discomfort with leg internal rotation  Positive logroll maneuver  Positive hip scour maneuver  Normal Stinchfield test   Positive Becky finger test   Positive for pain with Issa's maneuver    Gait is normal     Procedures

## 2022-06-02 ENCOUNTER — TELEPHONE (OUTPATIENT)
Dept: OBGYN CLINIC | Facility: MEDICAL CENTER | Age: 47
End: 2022-06-02

## 2022-06-08 ENCOUNTER — EVALUATION (OUTPATIENT)
Dept: PHYSICAL THERAPY | Facility: CLINIC | Age: 47
End: 2022-06-08
Payer: COMMERCIAL

## 2022-06-08 DIAGNOSIS — M54.50 CHRONIC BILATERAL LOW BACK PAIN WITHOUT SCIATICA: Primary | ICD-10-CM

## 2022-06-08 DIAGNOSIS — M53.3 SI (SACROILIAC) JOINT DYSFUNCTION: ICD-10-CM

## 2022-06-08 DIAGNOSIS — G89.29 CHRONIC BILATERAL LOW BACK PAIN WITHOUT SCIATICA: Primary | ICD-10-CM

## 2022-06-08 DIAGNOSIS — M25.852 LEFT HIP IMPINGEMENT SYNDROME: ICD-10-CM

## 2022-06-08 PROCEDURE — 97110 THERAPEUTIC EXERCISES: CPT | Performed by: PHYSICAL THERAPIST

## 2022-06-08 PROCEDURE — 97162 PT EVAL MOD COMPLEX 30 MIN: CPT | Performed by: PHYSICAL THERAPIST

## 2022-06-15 ENCOUNTER — EVALUATION (OUTPATIENT)
Dept: PHYSICAL THERAPY | Facility: CLINIC | Age: 47
End: 2022-06-15
Payer: COMMERCIAL

## 2022-06-15 DIAGNOSIS — G89.29 CHRONIC BILATERAL LOW BACK PAIN WITHOUT SCIATICA: ICD-10-CM

## 2022-06-15 DIAGNOSIS — M54.50 CHRONIC BILATERAL LOW BACK PAIN WITHOUT SCIATICA: ICD-10-CM

## 2022-06-15 DIAGNOSIS — M25.852 LEFT HIP IMPINGEMENT SYNDROME: Primary | ICD-10-CM

## 2022-06-15 DIAGNOSIS — M53.3 SI (SACROILIAC) JOINT DYSFUNCTION: ICD-10-CM

## 2022-06-15 PROCEDURE — 97110 THERAPEUTIC EXERCISES: CPT | Performed by: PHYSICAL THERAPIST

## 2022-06-15 PROCEDURE — 97140 MANUAL THERAPY 1/> REGIONS: CPT | Performed by: PHYSICAL THERAPIST

## 2022-06-15 PROCEDURE — 97112 NEUROMUSCULAR REEDUCATION: CPT | Performed by: PHYSICAL THERAPIST

## 2022-06-15 NOTE — PROGRESS NOTES
Daily Note     Today's date: 6/15/2022  Patient name: Sanford Cash  : 1975  MRN: [id number]  Referring provider: Donovan Tovar DO  Dx:   Encounter Diagnosis     ICD-10-CM    1  Left hip impingement syndrome  M25 852    2  Chronic bilateral low back pain without sciatica  M54 50     G89 29    3  SI (sacroiliac) joint dysfunction  M53 3                   Subjective: Pt states back is sore from playing [employer] last night  He was sore over weekend from doing a lot of driving and sitting for work and in addition to playing bball  He has felt [id number] with HEP  Has done 4x, not every day  Objective: See treatment diary below      Assessment: Tolerated treatment well  Patient would benefit from continued PT  HEP updated  Pt with difficulty and discomfort lying supine and hooklying  He does well with prone based exercises   Tenderness noted at left piriformis though tolerates manuals  He has tendency to valsalva with core exercises today, cues to not hold breath needed  He has soreness not with exercises though with supine position  Updated HEP    Plan: Progress treatment as tolerated         Precautions: none       Manuals 6/8 6/15           Prone quad stretch bl  6'           Left piriformis release   4'                                     Neuro Re-Ed             steamboats  r T bx10 3 way bl            SLS             Single leg bridge             TA with march             TA with single leg lower                                       Ther Ex             Hamstring stretch  20"X3 20" x3            Figure 4 seated  20"X3            clamshells g 20x            TB bridge w band G 20x            TB side step G 1'            Lateral step ups              Fire hydrants   10x bl            Side lying hip abd                                                                                                         Ther Activity             LTR 19x10"            DKTC              Gait Training Modalities

## 2022-06-29 ENCOUNTER — EVALUATION (OUTPATIENT)
Dept: PHYSICAL THERAPY | Facility: CLINIC | Age: 47
End: 2022-06-29
Payer: COMMERCIAL

## 2022-06-29 DIAGNOSIS — G89.29 CHRONIC BILATERAL LOW BACK PAIN WITHOUT SCIATICA: ICD-10-CM

## 2022-06-29 DIAGNOSIS — M25.852 LEFT HIP IMPINGEMENT SYNDROME: Primary | ICD-10-CM

## 2022-06-29 DIAGNOSIS — M54.50 CHRONIC BILATERAL LOW BACK PAIN WITHOUT SCIATICA: ICD-10-CM

## 2022-06-29 DIAGNOSIS — M53.3 SI (SACROILIAC) JOINT DYSFUNCTION: ICD-10-CM

## 2022-06-29 PROCEDURE — 97110 THERAPEUTIC EXERCISES: CPT | Performed by: PHYSICAL THERAPIST

## 2022-06-29 PROCEDURE — 97112 NEUROMUSCULAR REEDUCATION: CPT | Performed by: PHYSICAL THERAPIST

## 2022-06-29 PROCEDURE — 97530 THERAPEUTIC ACTIVITIES: CPT | Performed by: PHYSICAL THERAPIST

## 2022-06-29 NOTE — PROGRESS NOTES
Daily Note     Today's date: 2022  Patient name: Serenity Milner  : 1975  MRN: [id number]  Referring provider: René Gorman DO  Dx:   Encounter Diagnosis     ICD-10-CM    1  Left hip impingement syndrome  M25 852    2  Chronic bilateral low back pain without sciatica  M54 50     G89 29    3  SI (sacroiliac) joint dysfunction  M53 3                   Subjective: Pt states he has not really had any flare ups over last 2 weeks since last session  He had basketball once last week and didn't have much pain after in hip or back  Objective: See treatment diary below      Assessment: Tolerated treatment well  Patient would benefit from continued PT however he would like to progress to HEP at this time due to high copay  He has good form with exercises though remains significantly tight into LEs and back  Progressed core stability exercises today with difficulty breathing and activating TA  No sig difficulty performing exercise sand maintains good form  Updated HEP today  Plan: Pt may return when needed though whishes to hold on PT at this time due to high copay       Precautions: none       Manuals 6/8 6/15 6/29          Prone quad stretch bl  6'           Left piriformis release   4'                                     Neuro Re-Ed             steamboats  r T bx10 3 way bl            SLS             Single leg bridge             TA with march   x15           TA with single leg lower and kick out    x15                                     Ther Ex             Hamstring stretch  20"X3 20" x3  20"X3           Figure 4 seated  20"X3            clamshells g 20x            TB bridge w band G 20x  bridge with PB 2x10           TB side step G 1'            Lateral step ups              Fire hydrants   10x bl            Side lying hip abd    x15 bl                                                                                                      Ther Activity             LTR 19x10"            DKTC Gait Training                                       Modalities

## 2022-08-22 DIAGNOSIS — G89.29 CHRONIC LEFT-SIDED LOW BACK PAIN WITHOUT SCIATICA: ICD-10-CM

## 2022-08-22 DIAGNOSIS — M54.50 CHRONIC LEFT-SIDED LOW BACK PAIN WITHOUT SCIATICA: ICD-10-CM

## 2022-08-22 RX ORDER — MELOXICAM 15 MG/1
TABLET ORAL
Qty: 90 TABLET | Refills: 0 | Status: SHIPPED | OUTPATIENT
Start: 2022-08-22

## 2023-09-05 ENCOUNTER — TELEPHONE (OUTPATIENT)
Age: 48
End: 2023-09-05

## 2023-09-05 ENCOUNTER — PREP FOR PROCEDURE (OUTPATIENT)
Age: 48
End: 2023-09-05

## 2023-09-05 DIAGNOSIS — Z80.0 FAMILY HISTORY OF COLON CANCER: Primary | ICD-10-CM

## 2023-09-05 NOTE — TELEPHONE ENCOUNTER
09/05/23  Screened by: Stan Mae    Referring Provider     Pre- Screening: There is no height or weight on file to calculate BMI.   5'11.75  240 lbs  BMI 32.8  Has patient been referred for a routine screening Colonoscopy? yes  Is the patient between 43-73 years old? yes      Previous Colonoscopy yes   If yes:    Date: 2019    Facility:     Reason: family history of colon cancer       SCHEDULING STAFF: If the patient is between 39yrs-51yrs, please advise patient to confirm benefits/coverage with their insurance company for a routine screening colonoscopy, some insurance carriers will only cover at [facility] or [facility] [place][note 2]. If the patient is over 66years old, please schedule an office visit. Does the patient want to see a Gastroenterologist prior to their procedure OR are they having any GI symptoms? no    Has the patient been hospitalized or had abdominal surgery in the past 6 months? no    Does the patient use supplemental oxygen? no    Does the patient take Coumadin, Lovenox, Plavix, Elliquis, Xarelto, or other blood thinning medication? no    Has the patient had a stroke, cardiac event, or stent placed in the past year? no     Patient passed OA     SCHEDULING STAFF: If patient answers NO to above questions, then schedule procedure. If patient answers YES to above questions, then schedule office appointment. If patient is between 45yrs - 49yrs, please advise patient that we will have to confirm benefits & coverage with their insurance company for a routine screening colonoscopy.

## 2023-09-05 NOTE — TELEPHONE ENCOUNTER
Scheduled date of colonoscopy (as of today): 9/21/23  Physician performing colonoscopy: Kiet Reis  Location of colonoscopy: [facility]  Bowel prep reviewed with patient: Miralax Dulcolax prep instructions reviewed and sent via [employer]   Instructions reviewed with patient by: md  Clearances:

## 2023-10-20 NOTE — PROGRESS NOTES
PT Evaluation     Today's date: 2022  Patient name: Trinidad Gibbs  : 1975  MRN: [id number]  Referring provider: Cindy Hillman DO  Dx:   Encounter Diagnosis     ICD-10-CM    1  Chronic bilateral low back pain without sciatica  M54 50     G89 29    2  Left hip impingement syndrome  M25 852 Ambulatory referral to Physical Therapy   3  SI (sacroiliac) joint dysfunction  M53 3                   Assessment  Assessment details: Trinidad Gibbs is a 55 y o  male who presents with complaints of left sided hip and low back pain consistent with referring diagnoses of impingement syndrome and SIJ dysfunction  He demonstrates LE weakness, significant decrease in LE flexibility, decreased core strength and stability, decreased activity tolerance, and decreased function  Patient would benefit from skilled physical therapy in order to address said deficits and improve functional mobility  Barriers to therapy: High copay   Understanding of Dx/Px/POC: good   Prognosis: good    Goals  Independent with HEP (3 V only)     Plan  Patient would benefit from: skilled physical therapy  Planned therapy interventions: abdominal trunk stabilization, activity modification, joint mobilization, manual therapy, massage, patient education, postural training, stretching, strengthening, balance, therapeutic activities, therapeutic exercise, flexibility, functional ROM exercises and home exercise program  Frequency: 1x week  Duration in weeks: 6  Treatment plan discussed with: patient        Subjective Evaluation    History of Present Illness  Mechanism of injury: Patient reports about 2 months ago playing basketball and came down off a rebound and had increased pain in groin  He notes he was given steriod and has been feeling much better  He notes he has not really had pain over last 2 weeks       He has been playing basketball and notes     Left-sided low back and groin pain that is multifactorial and predominantly due to sacroiliac joint dysfunction and pincer type femoroacetabular impingement syndrome  There are no concerning neurological deficits      We discussed different treatment options and decided to proceed with prednisone burst to be followed by meloxicam       The patient should start physical therapy as soon as possible        I will see them back after 3-4 weeks of physical therapy or earlier if symptoms worsen/change      Can consider ultrasound-guided hip injection depending on presentation  Onset/Mechanism:  A few months ago he was playing basketball and the pain started after he came down from a rebound  Location:  Left groin and low back area  Radiation:  Denies  Quality:  Painful  Provocative: Inactivity  Severity:  5/10  Associated Symptoms:  Denies  Treatment so far: Activity modification      No red flag symptoms including fever/chills, unintentional weight change, bowel/bladder incontinence, scissoring gait, personal/family history of cancer, pain worse at night, intravenous drug abuse or trauma            Objective     General Comments:      Lumbar Comments  Flexion: mod-severe restriction to mid shin-left SIJ pain and l/s soreness upon return  Extension: moderate-severe restriction: pain across low back and at left SIJ  R SB: mod restrition R SIJ pain  L SB: mod restcition L SIJ pain     Left LE 4/5             Precautions: none       Manuals 6/8            Prone quad stretch              Left piriformis release                                        Neuro Re-Ed             steamboats             SLS             Single leg bridge             TA with march             TA with single leg lower                                       Ther Ex             Hamstring stretch  20"X3            Figure 4 seated  20"X3            clamshells g 20x            TB bridge w band G 20x            TB side step G 1'            Lateral step ups              Fire hydrants              Side lying hip abd Ther Activity             LTR 19x10"            TC              Gait Training                                       Modalities Allergic reaction

## 2023-10-26 ENCOUNTER — ANESTHESIA EVENT (OUTPATIENT)
Dept: ANESTHESIOLOGY | Facility: AMBULATORY SURGERY CENTER | Age: 48
End: 2023-10-26

## 2023-10-26 ENCOUNTER — ANESTHESIA EVENT (OUTPATIENT)
Dept: GASTROENTEROLOGY | Facility: AMBULATORY SURGERY CENTER | Age: 48
End: 2023-10-26

## 2023-10-26 ENCOUNTER — ANESTHESIA (OUTPATIENT)
Dept: ANESTHESIOLOGY | Facility: AMBULATORY SURGERY CENTER | Age: 48
End: 2023-10-26

## 2023-11-09 ENCOUNTER — HOSPITAL ENCOUNTER (OUTPATIENT)
Dept: GASTROENTEROLOGY | Facility: AMBULATORY SURGERY CENTER | Age: 48
Discharge: HOME | End: 2023-11-09
Payer: COMMERCIAL

## 2023-11-09 ENCOUNTER — ANESTHESIA (OUTPATIENT)
Dept: GASTROENTEROLOGY | Facility: AMBULATORY SURGERY CENTER | Age: 48
End: 2023-11-09

## 2023-11-09 VITALS
WEIGHT: 238 LBS | HEIGHT: 72 IN | HEART RATE: 74 BPM | TEMPERATURE: 97.3 F | BODY MASS INDEX: 32.23 KG/M2 | DIASTOLIC BLOOD PRESSURE: 78 MMHG | SYSTOLIC BLOOD PRESSURE: 124 MMHG | OXYGEN SATURATION: 98 % | RESPIRATION RATE: 18 BRPM

## 2023-11-09 DIAGNOSIS — Z80.0 FAMILY HISTORY OF COLON CANCER: ICD-10-CM

## 2023-11-09 PROCEDURE — G0105 COLORECTAL SCRN; HI RISK IND: HCPCS | Performed by: INTERNAL MEDICINE

## 2023-11-09 RX ORDER — PROPOFOL 10 MG/ML
INJECTION, EMULSION INTRAVENOUS AS NEEDED
Status: DISCONTINUED | OUTPATIENT
Start: 2023-11-09 | End: 2023-11-09

## 2023-11-09 RX ORDER — SODIUM CHLORIDE, SODIUM LACTATE, POTASSIUM CHLORIDE, CALCIUM CHLORIDE 600; 310; 30; 20 MG/100ML; MG/100ML; MG/100ML; MG/100ML
INJECTION, SOLUTION INTRAVENOUS CONTINUOUS PRN
Status: DISCONTINUED | OUTPATIENT
Start: 2023-11-09 | End: 2023-11-09

## 2023-11-09 RX ADMIN — SODIUM CHLORIDE, SODIUM LACTATE, POTASSIUM CHLORIDE, CALCIUM CHLORIDE: 600; 310; 30; 20 INJECTION, SOLUTION INTRAVENOUS at 08:21

## 2023-11-09 RX ADMIN — PROPOFOL 100 MG: 10 INJECTION, EMULSION INTRAVENOUS at 07:58

## 2023-11-09 RX ADMIN — SODIUM CHLORIDE, SODIUM LACTATE, POTASSIUM CHLORIDE, CALCIUM CHLORIDE: 600; 310; 30; 20 INJECTION, SOLUTION INTRAVENOUS at 07:53

## 2023-11-09 RX ADMIN — PROPOFOL 100 MG: 10 INJECTION, EMULSION INTRAVENOUS at 08:08

## 2023-11-09 RX ADMIN — PROPOFOL 50 MG: 10 INJECTION, EMULSION INTRAVENOUS at 08:14

## 2023-11-09 RX ADMIN — PROPOFOL 100 MG: 10 INJECTION, EMULSION INTRAVENOUS at 08:01

## 2023-11-09 RX ADMIN — PROPOFOL 100 MG: 10 INJECTION, EMULSION INTRAVENOUS at 08:04

## 2023-11-09 NOTE — H&P
History and Physical -  [facility]  Livia Lin 50 y.o. male MRN: [id number]                  HPI: Livia Lin is a 50y.o. year old male who presents for colonoscopy for screening purposes. Family history colon cancer. Mother and father had colon cancer 45s and 46s. Father had multiple cancers. REVIEW OF SYSTEMS: Per the HPI, and otherwise unremarkable. Historical Information   Past Medical History:   Diagnosis Date    Bone pain     Colon polyps     COVID 2022    Hypertension     no meds    Joint pain     Knee pain     from basketball    Muscle pain      Past Surgical History:   Procedure Laterality Date    COLONOSCOPY  10/27/2015    WISDOM TOOTH EXTRACTION       Social History   Social History     Substance and Sexual Activity   Alcohol Use Yes    Alcohol/week: 4.0 standard drinks of alcohol    Types: 4 Cans of beer per week     Social History     Substance and Sexual Activity   Drug Use Not Currently     Social History     Tobacco Use   Smoking Status Former    Types: Cigarettes    Quit date:     Years since quittin.8   Smokeless Tobacco Never   Tobacco Comments    Smoker in college only- brief     Family History   Problem Relation Age of Onset    Colon cancer Mother     Hypertension Father     Colon cancer Father 48    Testicular cancer Father 39    Prostate cancer Father 72    Kidney cancer Father 72    Cancer Other        Meds/Allergies       Current Outpatient Medications:     Cholecalciferol (VITAMIN D-3 PO)    No Known Allergies    Objective     /87   Pulse 66   Temp (!) 97.3 °F (36.3 °C)   Resp 18   Ht 5' 11.75" (1.822 m)   Wt 108 kg (238 lb)   SpO2 97%   BMI 32.50 kg/m²       PHYSICAL EXAM    Gen: NAD  Head: NCAT  CV: RRR  CHEST: Clear  ABD: soft, NT/ND  EXT: no edema      ASSESSMENT/PLAN:  This is a 50y.o. year old male here for colonoscopy, and he is stable and optimized for his procedure.

## 2023-11-09 NOTE — ANESTHESIA POSTPROCEDURE EVALUATION
Post-Op Assessment Note    CV Status:  Stable  Pain Score: 0    Pain management: adequate     Mental Status:  Alert and awake   Hydration Status:  Euvolemic   PONV Controlled:  Controlled   Airway Patency:  Patent      Post Op Vitals Reviewed: Yes      Staff: CRNA         No notable events documented.     BP   117/75   Temp  98   Pulse  64   Resp   16   SpO2   99

## 2023-11-09 NOTE — ANESTHESIA PREPROCEDURE EVALUATION
Procedure:  COLONOSCOPY    Relevant Problems   MUSCULOSKELETAL   (+) Chronic bilateral low back pain without sciatica      NEURO/PSYCH   (+) Chronic bilateral low back pain without sciatica        Physical Exam    Airway    Mallampati score: III  TM Distance: >3 FB  Neck ROM: full     Dental   No notable dental hx     Cardiovascular  Cardiovascular exam normal    Pulmonary  Pulmonary exam normal     Other Findings        Anesthesia Plan  ASA Score- 2     Anesthesia Type- IV sedation with anesthesia with ASA Monitors. Additional Monitors:     Airway Plan:            Plan Factors-Exercise tolerance (METS): >4 METS. Chart reviewed. Patient summary reviewed. Patient is not a current smoker. Induction- intravenous. Postoperative Plan-     Informed Consent- Anesthetic plan and risks discussed with patient.

## 2024-02-16 ENCOUNTER — TELEPHONE (OUTPATIENT)
Dept: FAMILY MEDICINE CLINIC | Facility: CLINIC | Age: 49
End: 2024-02-16

## 2024-02-16 DIAGNOSIS — Z13.29 SCREENING FOR THYROID DISORDER: ICD-10-CM

## 2024-02-16 DIAGNOSIS — E55.9 VITAMIN D INSUFFICIENCY: ICD-10-CM

## 2024-02-16 DIAGNOSIS — Z13.89 SCREENING FOR BLOOD OR PROTEIN IN URINE: ICD-10-CM

## 2024-02-16 DIAGNOSIS — D50.8 IRON DEFICIENCY ANEMIA SECONDARY TO INADEQUATE DIETARY IRON INTAKE: ICD-10-CM

## 2024-02-16 DIAGNOSIS — E78.00 PURE HYPERCHOLESTEROLEMIA: Primary | ICD-10-CM

## 2024-03-30 ENCOUNTER — APPOINTMENT (OUTPATIENT)
Dept: LAB | Facility: CLINIC | Age: 49
End: 2024-03-30
Payer: COMMERCIAL

## 2024-03-30 DIAGNOSIS — E78.00 PURE HYPERCHOLESTEROLEMIA: ICD-10-CM

## 2024-03-30 DIAGNOSIS — D50.8 IRON DEFICIENCY ANEMIA SECONDARY TO INADEQUATE DIETARY IRON INTAKE: ICD-10-CM

## 2024-03-30 DIAGNOSIS — Z13.29 SCREENING FOR THYROID DISORDER: ICD-10-CM

## 2024-03-30 DIAGNOSIS — E55.9 VITAMIN D INSUFFICIENCY: ICD-10-CM

## 2024-03-30 LAB
25(OH)D3 SERPL-MCNC: 45.3 NG/ML (ref 30–100)
ALBUMIN SERPL BCP-MCNC: 4.4 G/DL (ref 3.5–5)
ALP SERPL-CCNC: 63 U/L (ref 34–104)
ALT SERPL W P-5'-P-CCNC: 18 U/L (ref 7–52)
ANION GAP SERPL CALCULATED.3IONS-SCNC: 4 MMOL/L (ref 4–13)
AST SERPL W P-5'-P-CCNC: 13 U/L (ref 13–39)
BILIRUB SERPL-MCNC: 0.81 MG/DL (ref 0.2–1)
BILIRUB UR QL STRIP: NEGATIVE
BUN SERPL-MCNC: 12 MG/DL (ref 5–25)
CALCIUM SERPL-MCNC: 9.4 MG/DL (ref 8.4–10.2)
CHLORIDE SERPL-SCNC: 106 MMOL/L (ref 96–108)
CHOLEST SERPL-MCNC: 218 MG/DL
CLARITY UR: CLEAR
CO2 SERPL-SCNC: 27 MMOL/L (ref 21–32)
COLOR UR: COLORLESS
CREAT SERPL-MCNC: 1.2 MG/DL (ref 0.6–1.3)
CREAT UR-MCNC: 100.8 MG/DL
ERYTHROCYTE [DISTWIDTH] IN BLOOD BY AUTOMATED COUNT: 14.8 % (ref 11.6–15.1)
GFR SERPL CREATININE-BSD FRML MDRD: 71 ML/MIN/1.73SQ M
GLUCOSE P FAST SERPL-MCNC: 90 MG/DL (ref 65–99)
GLUCOSE UR STRIP-MCNC: NEGATIVE MG/DL
HCT VFR BLD AUTO: 46.9 % (ref 36.5–49.3)
HDLC SERPL-MCNC: 45 MG/DL
HGB BLD-MCNC: 14.7 G/DL (ref 12–17)
HGB UR QL STRIP.AUTO: NEGATIVE
KETONES UR STRIP-MCNC: NEGATIVE MG/DL
LDLC SERPL CALC-MCNC: 151 MG/DL (ref 0–100)
LEUKOCYTE ESTERASE UR QL STRIP: NEGATIVE
MCH RBC QN AUTO: 25.3 PG (ref 26.8–34.3)
MCHC RBC AUTO-ENTMCNC: 31.3 G/DL (ref 31.4–37.4)
MCV RBC AUTO: 81 FL (ref 82–98)
MICROALBUMIN UR-MCNC: <7 MG/L
MICROALBUMIN/CREAT 24H UR: <7 MG/G CREATININE (ref 0–30)
NITRITE UR QL STRIP: NEGATIVE
NONHDLC SERPL-MCNC: 173 MG/DL
PH UR STRIP.AUTO: 5.5 [PH]
PLATELET # BLD AUTO: 214 THOUSANDS/UL (ref 149–390)
PMV BLD AUTO: 10.5 FL (ref 8.9–12.7)
POTASSIUM SERPL-SCNC: 4.7 MMOL/L (ref 3.5–5.3)
PROT SERPL-MCNC: 7.2 G/DL (ref 6.4–8.4)
PROT UR STRIP-MCNC: NEGATIVE MG/DL
RBC # BLD AUTO: 5.8 MILLION/UL (ref 3.88–5.62)
SODIUM SERPL-SCNC: 137 MMOL/L (ref 135–147)
SP GR UR STRIP.AUTO: 1.01 (ref 1–1.03)
TRIGL SERPL-MCNC: 109 MG/DL
TSH SERPL DL<=0.05 MIU/L-ACNC: 1.89 UIU/ML (ref 0.45–4.5)
UROBILINOGEN UR STRIP-ACNC: <2 MG/DL
WBC # BLD AUTO: 5.08 THOUSAND/UL (ref 4.31–10.16)

## 2024-03-30 PROCEDURE — 80061 LIPID PANEL: CPT

## 2024-03-30 PROCEDURE — 80053 COMPREHEN METABOLIC PANEL: CPT

## 2024-03-30 PROCEDURE — 84443 ASSAY THYROID STIM HORMONE: CPT

## 2024-03-30 PROCEDURE — 36415 COLL VENOUS BLD VENIPUNCTURE: CPT

## 2024-03-30 PROCEDURE — 82306 VITAMIN D 25 HYDROXY: CPT

## 2024-03-30 PROCEDURE — 85027 COMPLETE CBC AUTOMATED: CPT

## 2024-04-22 ENCOUNTER — OFFICE VISIT (OUTPATIENT)
Dept: FAMILY MEDICINE CLINIC | Facility: CLINIC | Age: 49
End: 2024-04-22
Payer: COMMERCIAL

## 2024-04-22 VITALS
RESPIRATION RATE: 16 BRPM | SYSTOLIC BLOOD PRESSURE: 126 MMHG | HEART RATE: 60 BPM | OXYGEN SATURATION: 96 % | TEMPERATURE: 97.7 F | HEIGHT: 72 IN | WEIGHT: 233 LBS | DIASTOLIC BLOOD PRESSURE: 80 MMHG | BODY MASS INDEX: 31.56 KG/M2

## 2024-04-22 DIAGNOSIS — F45.8 PSYCHOGENIC AIR HUNGER: ICD-10-CM

## 2024-04-22 DIAGNOSIS — D50.8 IRON DEFICIENCY ANEMIA SECONDARY TO INADEQUATE DIETARY IRON INTAKE: ICD-10-CM

## 2024-04-22 DIAGNOSIS — Z11.59 NEED FOR HEPATITIS C SCREENING TEST: ICD-10-CM

## 2024-04-22 DIAGNOSIS — E55.9 VITAMIN D INSUFFICIENCY: ICD-10-CM

## 2024-04-22 DIAGNOSIS — E78.00 PURE HYPERCHOLESTEROLEMIA: ICD-10-CM

## 2024-04-22 DIAGNOSIS — Z80.0 FAMILY HX OF COLON CANCER: ICD-10-CM

## 2024-04-22 DIAGNOSIS — Z00.00 WELLNESS EXAMINATION: Primary | ICD-10-CM

## 2024-04-22 PROCEDURE — 99213 OFFICE O/P EST LOW 20 MIN: CPT | Performed by: FAMILY MEDICINE

## 2024-04-22 PROCEDURE — 99396 PREV VISIT EST AGE 40-64: CPT | Performed by: FAMILY MEDICINE

## 2024-04-22 NOTE — PATIENT INSTRUCTIONS
Better healthcare is in your genes. Learn more about a community health research program at [employer]     Who is eligible to join?  You are eligible to participate if you are 18 years or older at the time of enrollment. You are not eligible to participate if you are a recipient of a donor bone marrow or a stem cell transplant.     Is there a cost to participate?  There is no cost to participate and health insurance is not required.    What can I learn about in this study?  You can learn about inherited risks for:                                              Common cancers: hereditary breast and ovarian cancer, and                                colorectal cancer related to Escalante syndrome                              Heart disease: hereditary high cholesterol (also known as                                familial hypercholesterolemia)                             * You also have the opportunity to learn about your ancestry and                                other traits like, caffeine sensitivity, sleep patterns and more    How can I sign up? Go to [email or web address] or scan the QR Code to sign up.

## 2024-05-22 PROBLEM — Z11.59 NEED FOR HEPATITIS C SCREENING TEST: Status: RESOLVED | Noted: 2024-04-22 | Resolved: 2024-05-22

## 2024-11-24 ENCOUNTER — HOSPITAL ENCOUNTER (EMERGENCY)
Facility: HOSPITAL | Age: 49
Discharge: HOME | End: 2024-11-24
Attending: EMERGENCY MEDICINE
Payer: COMMERCIAL

## 2024-11-24 ENCOUNTER — APPOINTMENT (OUTPATIENT)
Dept: RADIOLOGY | Facility: HOSPITAL | Age: 49
End: 2024-11-24
Payer: COMMERCIAL

## 2024-11-24 VITALS
DIASTOLIC BLOOD PRESSURE: 84 MMHG | RESPIRATION RATE: 18 BRPM | SYSTOLIC BLOOD PRESSURE: 133 MMHG | OXYGEN SATURATION: 98 % | TEMPERATURE: 98.1 F | HEART RATE: 77 BPM

## 2024-11-24 DIAGNOSIS — S62.502A: Primary | ICD-10-CM

## 2024-11-24 PROCEDURE — 73140 X-RAY EXAM OF FINGER(S): CPT

## 2024-11-24 PROCEDURE — 99283 EMERGENCY DEPT VISIT LOW MDM: CPT

## 2024-11-24 RX ORDER — KETOROLAC TROMETHAMINE 30 MG/ML
15 INJECTION, SOLUTION INTRAMUSCULAR; INTRAVENOUS ONCE
Status: DISCONTINUED | OUTPATIENT
Start: 2024-11-24 | End: 2024-11-24 | Stop reason: HOSPADM

## 2024-11-24 RX ORDER — LIDOCAINE HYDROCHLORIDE 20 MG/ML
10 INJECTION, SOLUTION EPIDURAL; INFILTRATION; INTRACAUDAL; PERINEURAL ONCE
Status: DISCONTINUED | OUTPATIENT
Start: 2024-11-24 | End: 2024-11-24 | Stop reason: HOSPADM

## 2024-11-24 NOTE — ED PROVIDER NOTES
Time reflects when diagnosis was documented in both MDM as applicable and the Disposition within this note       Time User Action Codes Description Comment    11/24/2024  4:16 PM Beau Augustine Add [S62.502A] Closed fracture dislocation of interphalangeal joint of left thumb, initial encounter           ED Disposition       ED Disposition   Discharge    Condition   Stable    Date/Time   Sun Nov 24, 2024  4:13 PM    Comment   Ranjith Naidu discharge to home/self care.                   Assessment & Plan       Medical Decision Making  Patient presents with:  Thumb Injury: L thumb injury that happened about 30 mins ago, jammed thumbed across basketball. Normal sensation in hand just complaining of pain.   DDx includes fracture/dislocation, soft tissue injury, sprain  Workup per ED course: Closed fracture dislocation distal phalanx left thumb reduced with placement of AlumaFoam splint.  Patient tolerated well. neurovascular intact after reduction.   Discharged with PCP follow-up and referral orthopedic hand surgery  Dispo: Workup and return precautions reviewed. Patient expresses understanding, is comfortable with discharge, aggress to follow-up as advised and return to ED if symptoms recur.       Amount and/or Complexity of Data Reviewed  Radiology: ordered and independent interpretation performed.    Risk  Prescription drug management.        ED Course as of 11/24/24 2039   Sun Nov 24, 2024   1505 Left thumb pain after being struck by a basketball. Unable to bend tip of his thumb due to pain. Denies numbness/tingling, weakness.  Visible deformity distal aspect of left thumb.        Plan Xray thumb, analgesics with reduction and splinting.              Medications - No data to display      ED Risk Strat Scores                                               History of Present Illness       Chief Complaint   Patient presents with    Thumb Injury     L thumb injury that happened about 30 mins ago, jammed thumbed  across basketball. Normal sensation in hand just complaining of pain.        Past Medical History:   Diagnosis Date    Bone pain     Colon polyps     COVID 2022    Hypertension     no meds    Joint pain     Knee pain     from basketball    Muscle pain       Past Surgical History:   Procedure Laterality Date    COLONOSCOPY  10/27/2015    WISDOM TOOTH EXTRACTION        Family History   Problem Relation Age of Onset    Colon cancer Mother     Hypertension Father     Colon cancer Father 50    Testicular cancer Father 45    Prostate cancer Father 65    Kidney cancer Father 65    Cancer Other       Social History     Tobacco Use    Smoking status: Former     Current packs/day: 0.00     Types: Cigarettes     Quit date:      Years since quittin.9    Smokeless tobacco: Never    Tobacco comments:     Smoker in college only- brief   Vaping Use    Vaping status: Never Used   Substance Use Topics    Alcohol use: Yes     Alcohol/week: 4.0 standard drinks of alcohol     Types: 4 Cans of beer per week    Drug use: Not Currently      E-Cigarette/Vaping    E-Cigarette Use Never User       E-Cigarette/Vaping Substances    Nicotine No     THC No     CBD No     Flavoring No     Other No     Unknown No       I have reviewed and agree with the history as documented.     Ranjith Naidu is a 49 y.o. male p/w Left thumb pain after being struck by a basketball. Unable to bend tip of his thumb due to pain. Denies numbness/tingling, weakness.  Visible deformity distal aspect of left thumb. No other complaints at this time.  HPI    Review of Systems   All other systems reviewed and are negative.          Objective       ED Triage Vitals [24 1329]   Temperature Pulse Blood Pressure Respirations SpO2 Patient Position - Orthostatic VS   98.1 °F (36.7 °C) 77 133/84 18 98 % --      Temp Source Heart Rate Source BP Location FiO2 (%) Pain Score    Oral Monitor Right arm -- --      Vitals      Date and Time Temp Pulse SpO2 Resp BP  Pain Score FACES Pain Rating User   11/24/24 1329 98.1 °F (36.7 °C) 77 98 % 18 133/84 -- -- LD            Physical Exam    General appearance: resting comfortably, no acute distress   HENT: Normocephalic, atraumatic, hearing grossly intact, and mucous membranes moist   Eyes: Conjunctiva normal,   Lungs/Chest: Respirations even and unlabored, speaking full sentences  Cardiovascular: Normal rate, regular rhythm  Abdomen: soft, non-distended, non-tender  Musculoskeletal: extremities normal, no cyanosis or edema   -Left thumb with visible deformity distal aspect. Thumb ROM full at MCP, fixed deformity of distal IP, unable to articulate. SILT all distributions.  Pulses: radial / Ulnar pulses palpable  Skin: warm and dry   Neurologic: Awake and alert, no apparent focal deficit  Psych: Mood consistent with affect       Results Reviewed       None            XR thumb 2 views LEFT   ED Interpretation by Beau Augustine MD (11/24 4090)   Dislocation distal phalanx left first digit      Final Interpretation by Popeye Tejeda MD (11/24 1600)   Fracture dislocation across the first interphalangeal joint of the left thumb.      Findings concur with ED results as provided in PACS viewer/EPIC at the time of interpretation.            Computerized Assisted Algorithm (CAA) may have been used to analyze all applicable images.               Workstation performed: [id number]             Orthopedic injury treatment    Date/Time: 11/24/2024 4:05 PM    Performed by: Beau Augustine MD  Authorized by: Beau Augustine MD    Patient Location:  ED  [place] Protocol:  Consent: Verbal consent obtained.  Risks and benefits: risks, benefits and alternatives were discussed  Consent given by: patient  Patient understanding: patient states understanding of the procedure being performed  Patient identity confirmed: verbally with patient and arm band    Injury location:  Finger  Location details:  Left thumb  Injury type:   Dislocation  Dislocation type: IP    Neurovascular status: Neurovascularly intact    Distal perfusion: normal    Neurological function: normal    Range of motion: reduced    Local anesthesia used?: No    Manipulation performed?: Yes    Skeletal traction used?: Yes    Reduction successful?: Yes    Immobilization:  Splint  Splint type:  Finger splint, static  Supplies used:  Aluminum splint  Neurovascular status: Neurovascularly intact    Distal perfusion: normal    Neurological function: normal    Range of motion: improved    Patient tolerance:  Patient tolerated the procedure well with no immediate complications      ED Medication and Procedure Management   Prior to Admission Medications   Prescriptions Last Dose Informant Patient Reported? Taking?   Cholecalciferol (VITAMIN D-3 PO)   Yes No   Sig: Take by mouth in the morning      Facility-Administered Medications: None     Discharge Medication List as of 11/24/2024  4:17 PM        CONTINUE these medications which have NOT CHANGED    Details   Cholecalciferol (VITAMIN D-3 PO) Take by mouth in the morning, Historical Med             ED SEPSIS DOCUMENTATION   Time reflects when diagnosis was documented in both MDM as applicable and the Disposition within this note       Time User Action Codes Description Comment    11/24/2024  4:16 PM Beau Augustine Add [S62.502A] Closed fracture dislocation of interphalangeal joint of left thumb, initial encounter                  Beau Augustine MD  11/24/24 2038       Beau Augustine MD  11/24/24 2039

## 2025-02-05 ENCOUNTER — OFFICE VISIT (OUTPATIENT)
Dept: OBGYN CLINIC | Facility: OTHER | Age: 50
End: 2025-02-05
Payer: COMMERCIAL

## 2025-02-05 ENCOUNTER — APPOINTMENT (OUTPATIENT)
Dept: RADIOLOGY | Facility: OTHER | Age: 50
End: 2025-02-05
Payer: COMMERCIAL

## 2025-02-05 VITALS — BODY MASS INDEX: 32.62 KG/M2 | HEIGHT: 71 IN | WEIGHT: 233 LBS

## 2025-02-05 DIAGNOSIS — M75.51 SUBACROMIAL BURSITIS OF RIGHT SHOULDER JOINT: ICD-10-CM

## 2025-02-05 DIAGNOSIS — M75.81 ROTATOR CUFF TENDONITIS, RIGHT: ICD-10-CM

## 2025-02-05 DIAGNOSIS — M25.511 RIGHT SHOULDER PAIN, UNSPECIFIED CHRONICITY: Primary | ICD-10-CM

## 2025-02-05 DIAGNOSIS — M25.511 RIGHT SHOULDER PAIN, UNSPECIFIED CHRONICITY: ICD-10-CM

## 2025-02-05 PROCEDURE — 99203 OFFICE O/P NEW LOW 30 MIN: CPT | Performed by: ORTHOPAEDIC SURGERY

## 2025-02-05 PROCEDURE — 73030 X-RAY EXAM OF SHOULDER: CPT

## 2025-02-05 NOTE — PROGRESS NOTES
Orthopaedic Surgery - Office Note  Ranjith Naidu (49 y.o. male)   : 1975   MRN: [id number]  Encounter Date: 2025    Assessment / Plan    Right shoulder rotator cuff tendonitis/subacromial bursitis.  We discussed non-surgical treatment options including physical therapy vs CSI as backup option.  PT for right shoulder, rotator cuff, and scapular stabilizers strengthening and ROM. PT for 6-8 weeks. If improvement may progress to HEP. If no improvement, recommended PT for 3 months. Script provided.  Recommended lighter weights and lower reps for shoulder lifts for the next couple weeks with pain as a guide.  Let pain be a guide with gradual return to basketball. Explained playing through pain is not recommended.   X-rays of the right shoulder were obtained during today's visit and reviewed with the patient.  Ice, heat, over-the-counter anti-inflammatories and/or analgesics as needed for pain.  Follow-up:  Return if symptoms worsen or fail to improve.      Chief Complaint / Date of Onset  Chronic right shoulder pain  Injury Mechanism / Date  Woke up with shoulder pain following lifting shoulders and playing basketball in late 2025.  Surgery / Date  None    History of Present Illness   Ranjith Naidu is a 49 y.o. right hand dominant male who presents for evaluation of right shoulder pain referred by themselves.  Patient reports 5 out of 10 lateral right shoulder pain that began approximately 2.5 weeks ago..  His pain does radiate down the deltoid. Patient reports he lifted shoulders that day and then played basketball and woke up with increased shoulder pain.  He reports he then proceeded to hit the lateral aspect of the shoulder with worsening pain.  He reports his pain is worse in the mornings and with overhead  motions.  Patient reports he has been resting the shoulder and taking ibuprofen for pain as needed. Pain is gradually improving. Patient reports his goal is to be able to return to playing  "basketball. He denies any distal numbness or tingling.  He denies any previous injuries to the right shoulder.    Treatment Summary  Medications / Modalities  Ibuprofen  Bracing / Immobilization  None  Physical Therapy  None  Injections  None  Prior Surgeries  None  Other Treatments  None    Employment / Current Status  Office/desk job    Sport / Organization / Current Status  Lifting 3 to 4 days/week   Basketball 2 to 3 days a week      Review of Systems  Pertinent items are noted in HPI.  All other systems were reviewed and are negative.      Physical Exam  Ht 5' 11\" (1.803 m)   Wt 106 kg (233 lb)   BMI 32.50 kg/m²   Cons: Appears well.  No apparent distress.  Psych: Alert. Oriented x3.  Mood and affect normal.  Eyes: PERRLA, EOMI  Resp: Normal effort.  No audible wheezing or stridor.  CV: Palpable pulse.  No discernable arrhythmia.  No LE edema.  Lymph:  No palpable cervical, axillary, or inguinal lymphadenopathy.  Skin: Warm.  No palpable masses.  No visible lesions.  Neuro: Normal muscle tone.  Normal and symmetric DTR's.       Right Shoulder Exam  Alignment / Posture:  Normal shoulder posture.  Inspection:  No swelling. No edema. No erythema. No ecchymosis. No muscle atrophy. No deformity.  Palpation:  No tenderness.  ROM:  Shoulder . Shoulder ER 60. Shoulder IR L1.  Strength:  5/5 supraspinatus, infraspinatus, and subscapularis.  Stability:  No objective shoulder instability.  Tests: (+) Neer. (-) Nam. (-) Painful arc. (-) Belly press. (-) Speed. (-) [place].  Neurovascular:  Sensation intact in Ax/R/M/U nerve distributions. 2+ radial pulse.        Studies Reviewed  I have personally reviewed pertinent films in PACS.  XR of right shoulder - 02/05/2025 -moderate AC joint osteoarthritis.  Minimal glenohumeral joint osteoarthritis.  No acute fracture or dislocation.        No procedures today.    Medical, Surgical, Family, and Social History  The patient's medical history, family history, and social " history, were reviewed and updated as appropriate.    Past Medical History:   Diagnosis Date    Bone pain     Colon polyps     COVID 2022    Hypertension     no meds    Joint pain     Knee pain     from basketball    Muscle pain        Past Surgical History:   Procedure Laterality Date    COLONOSCOPY  10/27/2015    WISDOM TOOTH EXTRACTION         Family History   Problem Relation Age of Onset    Colon cancer Mother     Hypertension Father     Colon cancer Father 50    Testicular cancer Father 45    Prostate cancer Father 65    Kidney cancer Father 65    Cancer Other        Social History     Occupational History    Not on file   Tobacco Use    Smoking status: Former     Current packs/day: 0.00     Types: Cigarettes     Quit date:      Years since quittin.1    Smokeless tobacco: Never    Tobacco comments:     Smoker in college only- brief   Vaping Use    Vaping status: Never Used   Substance and Sexual Activity    Alcohol use: Yes     Alcohol/week: 4.0 standard drinks of alcohol     Types: 4 Cans of beer per week    Drug use: Not Currently    Sexual activity: Yes     Birth control/protection: Condom Male       No Known Allergies      Current Outpatient Medications:     Cholecalciferol (VITAMIN D-3 PO), Take by mouth in the morning, Disp: , Rfl:       Leonor Gonsales    Scribe Attestation      I,:  Leonor Gonsales am acting as a scribe while in the presence of the attending physician.:       I,:  Jonnie Montalvo MD personally performed the services described in this documentation    as scribed in my presence.: